# Patient Record
Sex: FEMALE | Race: WHITE | NOT HISPANIC OR LATINO | Employment: UNEMPLOYED | URBAN - METROPOLITAN AREA
[De-identification: names, ages, dates, MRNs, and addresses within clinical notes are randomized per-mention and may not be internally consistent; named-entity substitution may affect disease eponyms.]

---

## 2017-10-26 ENCOUNTER — HOSPITAL ENCOUNTER (OUTPATIENT)
Dept: PREADMISSION TESTING | Facility: HOSPITAL | Age: 42
Discharge: HOME | End: 2017-10-26
Attending: PODIATRIST
Payer: MEDICAID

## 2017-10-26 ENCOUNTER — ANESTHESIA EVENT (OUTPATIENT)
Dept: SURGERY | Facility: HOSPITAL | Age: 42
End: 2017-10-26
Payer: MEDICAID

## 2017-10-26 VITALS
BODY MASS INDEX: 34.36 KG/M2 | HEART RATE: 87 BPM | DIASTOLIC BLOOD PRESSURE: 78 MMHG | WEIGHT: 182 LBS | SYSTOLIC BLOOD PRESSURE: 144 MMHG | HEIGHT: 61 IN | OXYGEN SATURATION: 97 % | RESPIRATION RATE: 14 BRPM

## 2017-10-26 RX ORDER — SODIUM CHLORIDE, SODIUM LACTATE, POTASSIUM CHLORIDE, CALCIUM CHLORIDE 600; 310; 30; 20 MG/100ML; MG/100ML; MG/100ML; MG/100ML
INJECTION, SOLUTION INTRAVENOUS CONTINUOUS
Status: CANCELLED | OUTPATIENT
Start: 2017-10-26

## 2017-10-26 RX ORDER — LIDOCAINE HYDROCHLORIDE 10 MG/ML
1 INJECTION, SOLUTION EPIDURAL; INFILTRATION; INTRACAUDAL; PERINEURAL ONCE
Status: CANCELLED | OUTPATIENT
Start: 2017-10-26 | End: 2017-10-26

## 2017-10-26 NOTE — PRE-PROCEDURE INSTRUCTIONS
Ervin Familia - Boyfriend - [phone]    Allergies, medical, surgical, family and psychosocial histories reviewed with patient. Periop plan of care reviewed. Preop instructions given, including medications to take and to hold. Time allotted for questions to be addressed.  Patient verbalized understanding.

## 2017-10-26 NOTE — PRE-PROCEDURE INSTRUCTIONS
Pt states she had all of her testing done for clearance yesterday 10/25/17 at 2pm and is going to see her PCP today for final clearance.  Pt was given our fax number and instructed to have PCP office fax a copy of the testing and clearance to [phone] prior to leaving his office.  Pt verbalized understanding.

## 2017-10-26 NOTE — DISCHARGE INSTRUCTIONS
Your surgery is scheduled for 11/1/17.    Please report to Outpatient Surgery Intake Office on the 2nd FLOOR at 5:30 a.m.          INSTRUCTIONS IMPORTANT!!!  ¨ Do not eat or drink after 12 midnight-including water. OK to brush teeth, no   gum, candy or mints!        ____  Do not wear makeup, including mascara.  ____  No powder, lotions or creams to surgical area.  ____  Please remove all jewelry, including piercings and leave at home.  ____  No money or valuables needed. Please leave at home.  ____  Please bring any documents given by your doctor.  ____  If going home the same day, arrange for a ride home. You will not be able to             drive if Anesthesia was used.  ____  Wear loose fitting clothing. Allow for dressings, bandages.  ____  Stop Aspirin, Ibuprofen, Motrin and Aleve at least 3-5 days before surgery, unless otherwise instructed by your doctor, or the nurse.   You MAY use Tylenol/acetaminophen until day of surgery.  ____  Wash the surgical area with Hibiclens the night before surgery, and again the             morning of surgery.  Be sure to rinse hibiclens off completely (if instructed by   nurse).  ____  If you take diabetic medication, do not take am of surgery unless instructed by Doctor.  ____  Call MD for temperature above 101 degrees.  ____ Stop taking any Fish Oil supplement or any Vitamins that contain Vitamin E at least 5 days prior to surgery.  ____ Do Not wear your contact lenses the day of your procedure.  You may wear your glasses.        I have read or had read and explained to me, and understand the above information.  Additional comments or instructions:  For additional questions call [phone]     Take a Hibiclens shower twice a day for 3 days prior to surgery, including the morning of surgery.   Gargle with Listerine twice a day for 3 days prior to surgery, including the morning of surgery.      Pre-Op Bathing Instructions    Before surgery, you can play an important role in your  own health.    Because skin is not sterile, we need to be sure that your skin is as free of germs as possible. By following the instructions below, you can reduce the number of germs on your skin before surgery.    IMPORTANT: You will need to shower with a special soap called Hibiclens*, available at any pharmacy.  If you are allergic to Chlorhexidine (the antiseptic in Hibiclens), use an antibacterial soap such as Dial Soap for your preoperative shower.  You will shower with Hibiclens both the night before your surgery and the morning of your surgery.  Do not use Hibiclens on the head, face or genitals to avoid injury to those areas.    STEP #1: THE NIGHT BEFORE YOUR SURGERY     1. Do not shave the area of your body where your surgery will be performed.  2. Shower and wash your hair and body as usual with your normal soap and shampoo.  3. Rinse your hair and body thoroughly after you shower to remove all soap residue.  4. With your hand, apply one packet of Hibiclens soap to the surgical site.   5. Wash the site gently for five (5) minutes. Do not scrub your skin too hard.   6. Do not wash with your regular soap after Hibiclens is used.  7. Rinse your body thoroughly.  8. Pat yourself dry with a clean, soft towel.  9. Do not use lotion, cream, or powder.  10. Wear clean clothes.    STEP #2: THE MORNING OF YOUR SURGERY     1. Repeat Step #1.    * Not to be used by people allergic to Chlorhexidine.          Anesthesia: General Anesthesia     You are watched continuously during your procedure by your anesthesia provider.     Youre due to have surgery. During surgery, youll be given medicine called anesthesia or anesthetic. This will keep you comfortable and pain-free. Your anesthesia provider will use general anesthesia.  What is general anesthesia?  General anesthesia puts you into a state like deep sleep. It goes into the bloodstream (IV anesthetics), into the lungs (gas anesthetics), or both. You feel nothing  during the procedure. You will not remember it. During the procedure, the anesthesia provider monitors you continuously. He or she checks your heart rate and rhythm, blood pressure, breathing, and blood oxygen.  · IV anesthetics. IV anesthetics are given through an IV line in your arm. Theyre often given first. This is so you are asleep before a gas anesthetic is started. Some kinds of IV anesthetics relieve pain. Others relax you. Your doctor will decide which kind is best in your case.  · Gas anesthetics. Gas anesthetics are breathed into the lungs. They are often used to keep you asleep. They can be given through a facemask or a tube placed in your larynx or trachea (breathing tube).  ¨ If you have a facemask, your anesthesia provider will most likely place it over your nose and mouth while youre still awake. Youll breathe oxygen through the mask as your IV anesthetic is started. Gas anesthetic may be added through the mask.  ¨ If you have a tube in the larynx or trachea, it will be inserted into your throat after youre asleep.  Anesthesia tools and medicines  You will likely have:  · IV anesthetics. These are put into an IV line into your bloodstream.  · Gas anesthetics. You breathe these anesthetics into your lungs, where they pass into your bloodstream.  · Pulse oximeter. This is a small clip that is attached to the end of your finger. This measures your blood oxygen level.  · Electrocardiography leads (electrodes). These are small sticky pads that are placed on your chest. They record your heart rate and rhythm.  · Blood pressure cuff. This reads your blood pressure.  Risks and possible complications  General anesthesia has some risks. These include:  · Breathing problems  · Nausea and vomiting  · Sore throat or hoarseness (usually temporary)  · Allergic reaction to the anesthetic  · Irregular heartbeat (rare)  · Cardiac arrest (rare)   Anesthesia safety  · Follow all instructions you are given for how  long not to eat or drink before your procedure.  · Be sure your doctor knows what medicines and drugs you take. This includes over-the-counter medicines, herbs, supplements, alcohol or other drugs. You will be asked when those were last taken.  · Have an adult family member or friend drive you home after the procedure.  · For the first 24 hours after your surgery:  ¨ Do not drive or use heavy equipment.  ¨ Do not make important decisions or sign legal documents. If important decisions or signing legal documents is necessary during the first 24 hours after surgery, have a trusted family member or spouse act on your behalf.  ¨ Avoid alcohol.  ¨ Have a responsible adult stay with you. He or she can watch for problems and help keep you safe.  Date Last Reviewed: 12/1/2016  © 4889-9847 [employer]. [address] [place] [address], [place] [address]. All rights reserved. This information is not intended as a substitute for professional medical care. Always follow your healthcare professional's instructions.

## 2017-10-26 NOTE — ANESTHESIA PREPROCEDURE EVALUATION
10/26/2017  Raquel Rhodes is a 41 y.o., female is scheduled for left plantar fascia open release with neurolysis under GETA on 2017.    Outside PCP H&P and clearance with labs and EKG pending via fax on 10/26/2017.    Past Surgical History:   Procedure Laterality Date     SECTION      x 2    TONSILLECTOMY      TUBAL LIGATION           Anesthesia Evaluation    I have reviewed the Patient Summary Reports.    I have reviewed the Nursing Notes.   I have reviewed the Medications.     Review of Systems  Anesthesia Hx:  No problems with previous Anesthesia  History of prior surgery of interest to airway management or planning: Previous anesthesia: General, Epidural  Denies Personal Hx of Anesthesia complications.   Social:  Non-Smoker, No Alcohol Use    Hematology/Oncology:  Hematology Normal        EENT/Dental:EENT/Dental Normal   Cardiovascular:   Exercise tolerance: good Denies Hypertension.  Denies Dysrhythmias.   Denies Angina.        Pulmonary:   Denies Shortness of breath.    Renal/:  Renal/ Normal     Hepatic/GI:  Hepatic/GI Normal    Musculoskeletal:   Plantar fasciitis, left; pt with boot in place; denies pain at present   Neurological:  Neurology Normal    Endocrine:  Endocrine Normal        Physical Exam  General:  Obesity    Airway/Jaw/Neck:  Airway Findings: Mouth Opening: Normal Tongue: Normal  General Airway Assessment: Adult  Mallampati: II  TM Distance: Normal, at least 6 cm        Eyes/Ears/Nose:  EYES/EARS/NOSE FINDINGS: Normal   Dental:  Dental Findings: In tact    Chest/Lungs:  Chest/Lungs Clear    Heart/Vascular:  Heart Findings: Normal Heart murmur: negative    Abdomen:  Abdomen Findings: Normal    Musculoskeletal:  Musculoskeletal Findings: (boot to left foot)     Mental Status:  Mental Status Findings: Normal        Anesthesia Plan  Type of Anesthesia, risks &  benefits discussed:  Anesthesia Type:  general, regional  Patient's Preference:   Intra-op Monitoring Plan: standard ASA monitors  Intra-op Monitoring Plan Comments:   Post Op Pain Control Plan: multimodal analgesia  Post Op Pain Control Plan Comments:   Induction:   IV  Beta Blocker:  Patient is not currently on a Beta-Blocker (No further documentation required).       Informed Consent: Patient understands risks and agrees with Anesthesia plan.  Questions answered. Anesthesia consent signed with patient.  ASA Score: 2     Day of Surgery Review of History & Physical:            Ready For Surgery From Anesthesia Perspective.

## 2017-11-01 ENCOUNTER — ANESTHESIA (OUTPATIENT)
Dept: PREADMISSION TESTING | Facility: HOSPITAL | Age: 42
End: 2017-11-01
Payer: MEDICAID

## 2017-11-01 ENCOUNTER — HOSPITAL ENCOUNTER (OUTPATIENT)
Facility: HOSPITAL | Age: 42
Discharge: HOME | End: 2017-11-01
Attending: PODIATRIST | Admitting: PODIATRIST
Payer: MEDICAID

## 2017-11-01 DIAGNOSIS — M72.2 PLANTAR FASCIITIS: Primary | ICD-10-CM

## 2017-11-01 DIAGNOSIS — D36.10 NEUROMA: ICD-10-CM

## 2017-11-01 LAB
B-HCG UR QL: NEGATIVE
CTP QC/QA: YES

## 2017-11-01 PROCEDURE — 37000008 HC ANESTHESIA 1ST 15 MINUTES: Performed by: PODIATRIST

## 2017-11-01 PROCEDURE — 25000003 PHARM REV CODE 250: Performed by: NURSE PRACTITIONER

## 2017-11-01 PROCEDURE — 63600175 PHARM REV CODE 636 W HCPCS: Performed by: NURSE ANESTHETIST, CERTIFIED REGISTERED

## 2017-11-01 PROCEDURE — 71000015 HC POSTOP RECOV 1ST HR: Performed by: PODIATRIST

## 2017-11-01 PROCEDURE — 25000003 PHARM REV CODE 250: Performed by: PODIATRIST

## 2017-11-01 PROCEDURE — 37000009 HC ANESTHESIA EA ADD 15 MINS: Performed by: PODIATRIST

## 2017-11-01 PROCEDURE — 97161 PT EVAL LOW COMPLEX 20 MIN: CPT

## 2017-11-01 PROCEDURE — 36000706: Performed by: PODIATRIST

## 2017-11-01 PROCEDURE — 81025 URINE PREGNANCY TEST: CPT | Performed by: PODIATRIST

## 2017-11-01 PROCEDURE — V2790 AMNIOTIC MEMBRANE: HCPCS | Performed by: PODIATRIST

## 2017-11-01 PROCEDURE — 36000707: Performed by: PODIATRIST

## 2017-11-01 PROCEDURE — G8979 MOBILITY GOAL STATUS: HCPCS | Mod: CJ

## 2017-11-01 PROCEDURE — 71000016 HC POSTOP RECOV ADDL HR: Performed by: PODIATRIST

## 2017-11-01 PROCEDURE — S0077 INJECTION, CLINDAMYCIN PHOSP: HCPCS | Performed by: PODIATRIST

## 2017-11-01 PROCEDURE — G8980 MOBILITY D/C STATUS: HCPCS | Mod: CJ

## 2017-11-01 PROCEDURE — 63600175 PHARM REV CODE 636 W HCPCS: Performed by: PODIATRIST

## 2017-11-01 PROCEDURE — G8978 MOBILITY CURRENT STATUS: HCPCS | Mod: CJ

## 2017-11-01 DEVICE — IMPLANTABLE DEVICE: Type: IMPLANTABLE DEVICE | Site: FOOT | Status: FUNCTIONAL

## 2017-11-01 RX ORDER — HYDROMORPHONE HYDROCHLORIDE 2 MG/ML
0.5 INJECTION, SOLUTION INTRAMUSCULAR; INTRAVENOUS; SUBCUTANEOUS EVERY 5 MIN PRN
Status: CANCELLED | OUTPATIENT
Start: 2017-11-01

## 2017-11-01 RX ORDER — LIDOCAINE HYDROCHLORIDE 10 MG/ML
1 INJECTION, SOLUTION EPIDURAL; INFILTRATION; INTRACAUDAL; PERINEURAL ONCE
Status: DISCONTINUED | OUTPATIENT
Start: 2017-11-01 | End: 2017-11-01 | Stop reason: HOSPADM

## 2017-11-01 RX ORDER — FENTANYL CITRATE 50 UG/ML
INJECTION, SOLUTION INTRAMUSCULAR; INTRAVENOUS
Status: DISCONTINUED | OUTPATIENT
Start: 2017-11-01 | End: 2017-11-01

## 2017-11-01 RX ORDER — CLINDAMYCIN PHOSPHATE 600 MG/50ML
600 INJECTION, SOLUTION INTRAVENOUS
Status: COMPLETED | OUTPATIENT
Start: 2017-11-06 | End: 2017-11-01

## 2017-11-01 RX ORDER — HYDROMORPHONE HYDROCHLORIDE 2 MG/ML
0.2 INJECTION, SOLUTION INTRAMUSCULAR; INTRAVENOUS; SUBCUTANEOUS EVERY 5 MIN PRN
Status: CANCELLED | OUTPATIENT
Start: 2017-11-01

## 2017-11-01 RX ORDER — LIDOCAINE HYDROCHLORIDE 10 MG/ML
INJECTION, SOLUTION EPIDURAL; INFILTRATION; INTRACAUDAL; PERINEURAL
Status: DISCONTINUED | OUTPATIENT
Start: 2017-11-01 | End: 2017-11-01 | Stop reason: HOSPADM

## 2017-11-01 RX ORDER — LIDOCAINE HCL/PF 100 MG/5ML
SYRINGE (ML) INTRAVENOUS
Status: DISCONTINUED | OUTPATIENT
Start: 2017-11-01 | End: 2017-11-01

## 2017-11-01 RX ORDER — MIDAZOLAM HYDROCHLORIDE 1 MG/ML
INJECTION, SOLUTION INTRAMUSCULAR; INTRAVENOUS
Status: DISCONTINUED | OUTPATIENT
Start: 2017-11-01 | End: 2017-11-01

## 2017-11-01 RX ORDER — ROPIVACAINE HYDROCHLORIDE 5 MG/ML
INJECTION, SOLUTION EPIDURAL; INFILTRATION; PERINEURAL
Status: DISCONTINUED | OUTPATIENT
Start: 2017-11-01 | End: 2017-11-01 | Stop reason: HOSPADM

## 2017-11-01 RX ORDER — ONDANSETRON 2 MG/ML
4 INJECTION INTRAMUSCULAR; INTRAVENOUS DAILY PRN
Status: CANCELLED | OUTPATIENT
Start: 2017-11-01

## 2017-11-01 RX ORDER — PROPOFOL 10 MG/ML
VIAL (ML) INTRAVENOUS
Status: DISCONTINUED | OUTPATIENT
Start: 2017-11-01 | End: 2017-11-01

## 2017-11-01 RX ORDER — PROPOFOL 10 MG/ML
VIAL (ML) INTRAVENOUS CONTINUOUS PRN
Status: DISCONTINUED | OUTPATIENT
Start: 2017-11-01 | End: 2017-11-01

## 2017-11-01 RX ORDER — SODIUM CHLORIDE, SODIUM LACTATE, POTASSIUM CHLORIDE, CALCIUM CHLORIDE 600; 310; 30; 20 MG/100ML; MG/100ML; MG/100ML; MG/100ML
INJECTION, SOLUTION INTRAVENOUS CONTINUOUS
Status: DISCONTINUED | OUTPATIENT
Start: 2017-11-01 | End: 2017-11-01 | Stop reason: HOSPADM

## 2017-11-01 RX ADMIN — PROPOFOL 150 MCG/KG/MIN: 10 INJECTION, EMULSION INTRAVENOUS at 07:11

## 2017-11-01 RX ADMIN — CLINDAMYCIN PHOSPHATE 600 MG: 12 INJECTION, SOLUTION INTRAVENOUS at 07:11

## 2017-11-01 RX ADMIN — PROPOFOL 70 MG: 10 INJECTION, EMULSION INTRAVENOUS at 07:11

## 2017-11-01 RX ADMIN — SODIUM CHLORIDE, SODIUM LACTATE, POTASSIUM CHLORIDE, AND CALCIUM CHLORIDE: .6; .31; .03; .02 INJECTION, SOLUTION INTRAVENOUS at 07:11

## 2017-11-01 RX ADMIN — MIDAZOLAM 2 MG: 1 INJECTION INTRAMUSCULAR; INTRAVENOUS at 07:11

## 2017-11-01 RX ADMIN — FENTANYL CITRATE 50 MCG: 50 INJECTION, SOLUTION INTRAMUSCULAR; INTRAVENOUS at 07:11

## 2017-11-01 RX ADMIN — LIDOCAINE HYDROCHLORIDE 60 MG: 20 INJECTION, SOLUTION INTRAVENOUS at 07:11

## 2017-11-01 NOTE — BRIEF OP NOTE
[facility]  Brief Operative Note     SUMMARY     Surgery Date: 11/1/2017     Surgeon(s) and Role:     * Corine Frausto MD - Resident - Assisting     * Colby Wise DPM - Primary        Pre-op Diagnosis:  Plantar fasciitis [M72.2]    Post-op Diagnosis:  Post-Op Diagnosis Codes:     * Plantar fasciitis [M72.2]    Procedure(s) (LRB):  OPEN RELEASE W/NEUROLYSIS Plantar fascia  (Left)    Anesthesia: Local MAC    Description of the findings of the procedure: Open release of plantar fascia left     Findings/Key Components: same as above     Estimated Blood Loss: <5ml         Specimens:   Specimen (12h ago through future)    None          Discharge Note    SUMMARY     Admit Date: 11/1/2017    Discharge Date and Time:  11/01/2017 8:23 AM    Hospital Course (synopsis of major diagnoses, care, treatment, and services provided during the course of the hospital stay): Patient was admitted for an inpatient procedure and tolerated the procedure well with no complications.       Final Diagnosis: Post-Op Diagnosis Codes:     * Plantar fasciitis [M72.2]    Disposition: Home or Self Care    Follow Up/Patient Instructions:     Medications:  Reconciled Home Medications:   Current Discharge Medication List      CONTINUE these medications which have NOT CHANGED    Details   acetaminophen-codeine 300-30mg (TYLENOL-CODEINE #3) 300-30 mg Tab Take by mouth.             Discharge Procedure Orders  Diet general     Weight bearing restrictions (specify)   Order Comments: Minimal Heel WB     Call MD for:  temperature >100.4     Call MD for:  persistent nausea and vomiting     Call MD for:  severe uncontrolled pain     Call MD for:  difficulty breathing, headache or visual disturbances     Call MD for:  redness, tenderness, or signs of infection (pain, swelling, redness, odor or green/yellow discharge around incision site)     Call MD for:  hives     Leave dressing on - Keep it clean, dry, and intact until clinic visit

## 2017-11-01 NOTE — PT/OT/SLP EVAL
Physical Therapy  Evaluation and Discharge Note    Raquel Rhodes   MRN: [id number]   Admitting Diagnosis: The primary encounter diagnosis was Plantar fasciitis. A diagnosis of Neuroma was also pertinent to this visit.    PT Received On: 17  PT Start Time: 903     PT Stop Time: 915    PT Total Time (min): 12 min       Billable Minutes:  Evaluation 12    Diagnosis:   The primary encounter diagnosis was Plantar fasciitis. A diagnosis of Neuroma was also pertinent to this visit. s/p surgery 17    History reviewed. No pertinent past medical history.   Past Surgical History:   Procedure Laterality Date     SECTION      x 2    TONSILLECTOMY      TUBAL LIGATION      after        Referring physician: Dr. Wise  Date referred to PT: 2017    General Precautions: Standard, fall  Orthopedic Precautions: LLE weight bearing as tolerated   Braces:  (LLE walking boot)            Patient History:  Living Environment Comment: Pt lives with her  and daughter in [facility] with no steps and tub/shower. Pt was independent with all functional mobility without AD at baseline.  Equipment Currently Used at Home: none  DME owned (not currently used): none    Previous Level of Function:  Ambulation Skills: independent  Transfer Skills: independent  ADL Skills: independent    Subjective:  Communicated with RN prior to session.  Pt reports she feels fine and she was able to just get to the bathroom.  Chief Complaint: none reported  Patient goals: to return home    Pain/Comfort  Pain Rating 1: 0/10      Objective:         Cognitive Exam:  Oriented to: Person, Place, Time and Situation    Follows Commands/attention: Follows multistep  commands  Communication: clear/fluent  Safety awareness/insight to disability: intact    Physical Exam:  Postural examination/scapula alignment: No postural abnormalities identified    Skin integrity: Visible skin intact and LLE boot donned  Edema: None noted     Sensation:    Intact    Lower Extremity Range of Motion:  Right Lower Extremity: WFL  Left Lower Extremity: WFL except ankle not assessed, in boot    Lower Extremity Strength:  Right Lower Extremity: WFL  Left Lower Extremity: LLE not assessed     Fine motor coordination:  Intact    Gross motor coordination: WFL    Functional Mobility:  Bed Mobility:  Scooting/Bridging: Modified Independent  Supine to Sit: Modified Independent  Sit to Supine: Modified Independent    Transfers:  Sit <> Stand Assistance: Supervision  Sit <> Stand Assistive Device: Axillary crutches    Gait:   Gait Distance: ~50 feet with B axillary crutches and supervision for verbal cues for 3-point gait pattern  Assistance 1: Supervision  Gait Assistive Device: Axillary crutches  Gait Pattern: 3-point gait  Gait Deviation(s): decreased hamida, increased time in double stance    Balance:   Static Sit: NORMAL: No deviations seen in posture held statically  Dynamic Sit: NORMAL: No deviations seen in posture held dynamically  Static Stand: NORMAL: No deviations seen in posture held statically  Dynamic stand: GOOD: Needs SUPERVISION only during gait and able to self right with moderate     Therapeutic Activities and Exercises:  Pt instructed in gait training with B axillary crutches and pt completed with supervision for cueing for 3-point gait pattern. Pt educated on step negotiation with crutches and pt verbalized understanding.    AM-PAC 6 CLICK MOBILITY  How much help from another person does this patient currently need?   1 = Unable, Total/Dependent Assistance  2 = A lot, Maximum/Moderate Assistance  3 = A little, Minimum/Contact Guard/Supervision  4 = None, Modified Cameron/Independent    Turning over in bed (including adjusting bedclothes, sheets and blankets)?: 4  Sitting down on and standing up from a chair with arms (e.g., wheelchair, bedside commode, etc.): 3  Moving from lying on back to sitting on the side of the bed?: 4  Moving to and from a bed  to a chair (including a wheelchair)?: 3  Need to walk in hospital room?: 3  Climbing 3-5 steps with a railing?: 3  Total Score: 20     AM-PAC Raw Score CMS G-Code Modifier Level of Impairment Assistance   6 % Total / Unable   7 - 9 CM 80 - 100% Maximal Assist   10 - 14 CL 60 - 80% Moderate Assist   15 - 19 CK 40 - 60% Moderate Assist   20 - 22 CJ 20 - 40% Minimal Assist   23 CI 1-20% SBA / CGA   24 CH 0% Independent/ Mod I     Patient left HOB elevated with call button in reach, RN notified and pt's  present.    Assessment:   Raquel Rhodes is a 41 y.o. female with a medical diagnosis of The primary encounter diagnosis was Plantar fasciitis. A diagnosis of Neuroma was also pertinent to this visit. and presents with LLE WBAT in boot and was instructed in gait training with B axillary crutches. Pt will benefit from OP PT upon d/c home.    Rehab identified problem list/impairments: Rehab identified problem list/impairments: weakness, impaired endurance, impaired self care skills, impaired functional mobilty, gait instability, impaired balance, orthopedic precautions    Rehab potential is good.    Activity tolerance: Good    Discharge recommendations: Discharge Facility/Level Of Care Needs: outpatient PT     Barriers to discharge: Barriers to Discharge: None    Equipment recommendations: Equipment Needed After Discharge: crutches, axillary     GOALS:    Physical Therapy Goals     Not on file          Multidisciplinary Problems (Resolved)        Problem: Physical Therapy Goal    Goal Priority Disciplines Outcome Goal Variances Interventions   Physical Therapy Goal   (Resolved)     PT/OT, PT Outcome(s) achieved     Description:  PT evaluation and gait training with crutches complete                    PLAN:    Patient to be seen  (d/c PT ) to address the above listed problems via    Plan of Care expires: 11/01/17  Plan of Care reviewed with: patient, spouse    Functional Assessment Tool Used: [facility]  Score:  20  Functional Limitation: Mobility: Walking and moving around  Mobility: Walking and Moving Around Current Status (): TREVON  Mobility: Walking and Moving Around Goal Status (): TREVON  Mobility: Walking and Moving Around Discharge Status (): TREVON Cristina, PT  11/01/2017

## 2017-11-01 NOTE — H&P
Anesthesia Plan  Type of Anesthesia, risks & benefits discussed:  Anesthesia Type:  general, regional  Patient's Preference:   Intra-op Monitoring Plan: standard ASA monitors  Intra-op Monitoring Plan Comments:   Post Op Pain Control Plan: multimodal analgesia  Post Op Pain Control Plan Comments:   Induction:   IV  Beta Blocker:  Patient is not currently on a Beta-Blocker (No further documentation required).       Informed Consent: Patient understands risks and agrees with Anesthesia plan.  Questions answered. Anesthesia consent signed with patient.  ASA Score: 2     Day of Surgery Review of History & Physical:            Ready For Surgery From Anesthesia Perspective.

## 2017-11-01 NOTE — PLAN OF CARE
Patient sitting up in bed, dressed, spouse at bedside. Patient aaox3. Vss. Denies any pain at this time. Patient evaluated by physical therapy and okay for discharge. Crutches provided to patient per PT. Patient states she feels comfortable for discharge. Patient urinated. Instructions given to the patient per MD orders with time for questions, verbalized understanding. Patient states she has her pain medication at home. Patient discharged via wheelchair to the car with spouse.

## 2017-11-01 NOTE — INTERVAL H&P NOTE
The patient has been examined and the H&P has been reviewed:    no changes    Anesthesia/Surgery risks, benefits and alternative options discussed and understood by patient/family.          Active Hospital Problems    Diagnosis  POA    Neuroma [D36.10]  Yes      Resolved Hospital Problems    Diagnosis Date Resolved POA   No resolved problems to display.

## 2017-11-01 NOTE — TRANSFER OF CARE
"Anesthesia Transfer of Care Note    Patient: Raquel Rhodes    Procedure(s) Performed: Procedure(s) (LRB):  OPEN RELEASE W/NEUROLYSIS (Left)    Patient location: OPS    Anesthesia Type: MAC    Transport from OR: Transported from OR on room air with adequate spontaneous ventilation    Post pain: adequate analgesia    Post assessment: no apparent anesthetic complications and tolerated procedure well    Post vital signs: stable    Level of consciousness: awake, alert and oriented    Nausea/Vomiting: no nausea/vomiting    Complications: none    Transfer of care protocol was followed      Last vitals:   Visit Vitals  BP (!) 144/66 (BP Location: Right arm, Patient Position: Lying)   Pulse 83   Temp 37.1 °C (98.8 °F) (Oral)   Resp 20   Ht 5' 1" (1.549 m)   Wt 82.6 kg (182 lb)   LMP 10/31/2017   SpO2 99%   BMI 34.39 kg/m²     "

## 2017-11-01 NOTE — ANESTHESIA POSTPROCEDURE EVALUATION
"Anesthesia Post Evaluation    Patient: Raquel Rhodes    Procedure(s) Performed: Procedure(s) (LRB):  OPEN RELEASE W/NEUROLYSIS (Left)    Final Anesthesia Type: MAC  Patient location during evaluation: OPS  Patient participation: Yes- Able to Participate  Level of consciousness: awake and alert and oriented  Post-procedure vital signs: reviewed and stable  Pain management: adequate  Airway patency: patent  PONV status at discharge: No PONV  Anesthetic complications: no      Cardiovascular status: blood pressure returned to baseline and hemodynamically stable  Respiratory status: unassisted, spontaneous ventilation and room air  Hydration status: euvolemic  Follow-up not needed.        Visit Vitals  BP (!) 144/66 (BP Location: Right arm, Patient Position: Lying)   Pulse 83   Temp 37.1 °C (98.8 °F) (Oral)   Resp 20   Ht 5' 1" (1.549 m)   Wt 82.6 kg (182 lb)   LMP 10/31/2017   SpO2 99%   BMI 34.39 kg/m²       Pain/Sheron Score: Pain Assessment Performed: Yes (11/1/2017  6:15 AM)  Presence of Pain: denies (11/1/2017  6:15 AM)  Sheron Score: 10 (11/1/2017  6:15 AM)      "

## 2017-11-01 NOTE — PLAN OF CARE
Problem: Physical Therapy Goal  Goal: Physical Therapy Goal  PT evaluation and gait training with crutches complete  Outcome: Outcome(s) achieved Date Met: 11/01/17  Gait training with axillary crutches completed.

## 2017-11-02 VITALS
OXYGEN SATURATION: 100 % | SYSTOLIC BLOOD PRESSURE: 127 MMHG | RESPIRATION RATE: 16 BRPM | HEIGHT: 61 IN | BODY MASS INDEX: 34.36 KG/M2 | WEIGHT: 182 LBS | HEART RATE: 74 BPM | TEMPERATURE: 98 F | DIASTOLIC BLOOD PRESSURE: 66 MMHG

## 2017-11-02 NOTE — OP NOTE
Operative Note       Surgery Date: 11/1/2017     Surgeon(s) and Role:     * Corine Frausto MD - Resident - Assisting     * Colby Wise DPM - Primary    Pre-op Diagnosis:  Plantar fasciitis [M72.2]    Post-op Diagnosis: Post-Op Diagnosis Codes:     * Plantar fasciitis [M72.2]    Procedure(s) (LRB):  OPEN RELEASE W/NEUROLYSIS (Left) Plantar fasciotomy open     Anesthesia: Local MAC + 18 cc of 0.25% marcaine plain    Procedure in Detail/Findings:    The patient was brought to the operating room on a stretcher and placed on the operating table in a supine position. Following the successful induction of MAC anesthesia, a tourniquet was applied to the patients left ankle. Following this, a local anesthetic block consisting of aproximately 18cc of 0.25% marcaine plain was injected. Then, the left  foot was scrubbed, prepped and draped in the usual aseptic manner. A marking pen was utilized to create a incision guide in a medial  fashion. A time out was performed and an esmark was used to exsanguinate the foot. The tourniquet was inflated to 250mmHg.      Attention was directed towards the inferior medial side of the left heel at the just distal to the plantar fascia insertion site at the calcaneus where a #15 blade was used to make a 5 cm skin incision. The incision was blunted deepened down to the level of the medial plantar fascia. Care was taken to avoid and protect all neurovascular structures. Multiple adhesions noted , all freed up with the #15 blade. Next abductor hallucis muscle identified along with calcaneal nerve. A # 15 blade was used to release plantar fascia.  Adequate release of the medial half of the plantar fascia was noted with exposure of the underlying FDB muscle belly. The area was copiously flushed with saline. The skin edges were reaaproximated with 4-0 nylon. Tourniquet was released with NVS noted to be intact. 2cc of bioD amniotic tissue injected. Triple abx, 4x4 gauze , kerlix and ACE  "applied. CAM boot was applied.      The patient tolerated the procedure and anesthesia well. He was transferred to the recovery room with vital signs stable, vascular status intact, and capillary refill time < 3 seconds to the distal left foot.    Corine Frausto DPM PGY-3      Estimated Blood Loss: <5ml           Specimens     None        Implants:   Implant Name Type Inv. Item Serial No.  Lot No. LRB No. Used   [id number]     [id number] [employer]   Left 1              Disposition: PACU - hemodynamically stable.           Condition: Good    Attestation:  I was present and scrubbed for the entire procedure.           Discharge Note    Admit Date: 11/1/2017    Attending Physician: No att. providers found     Discharge Physician: No att. providers found    Final Diagnosis: Post-Op Diagnosis Codes:     * Plantar fasciitis [M72.2]    Disposition: Home or Self Care    Patient Instructions:   Discharge Medication List as of 11/1/2017  9:27 AM      CONTINUE these medications which have NOT CHANGED    Details   acetaminophen-codeine 300-30mg (TYLENOL-CODEINE #3) 300-30 mg Tab Take by mouth., Historical Med             Discharge Procedure Orders (must include Diet, Follow-up, Activity)    Discharge Procedure Orders (must include Diet, Follow-up, Activity)  CRUTCHES FOR HOME USE   Order Specific Question Answer Comments   Type: Axillary    Height: 5' 1" (1.549 m)    Weight: 82.6 kg (182 lb)    Length of need (1-99 months): 99      Diet general     Weight bearing restrictions (specify)   Order Comments: Minimal Heel WB     Call MD for:  temperature >100.4     Call MD for:  persistent nausea and vomiting     Call MD for:  severe uncontrolled pain     Call MD for:  difficulty breathing, headache or visual disturbances     Call MD for:  redness, tenderness, or signs of infection (pain, swelling, redness, odor or green/yellow discharge around incision site)     Call MD for:  hives     Leave dressing on - Keep it " clean, dry, and intact until clinic visit          Discharge Date: 11/1/2017  9:40 AM

## 2018-10-04 ENCOUNTER — TELEPHONE (OUTPATIENT)
Dept: OBSTETRICS AND GYNECOLOGY | Facility: CLINIC | Age: 43
End: 2018-10-04

## 2018-11-08 ENCOUNTER — OFFICE VISIT (OUTPATIENT)
Dept: OBSTETRICS AND GYNECOLOGY | Facility: CLINIC | Age: 43
End: 2018-11-08
Payer: MEDICAID

## 2018-11-08 VITALS
HEIGHT: 61 IN | BODY MASS INDEX: 38.75 KG/M2 | SYSTOLIC BLOOD PRESSURE: 120 MMHG | WEIGHT: 205.25 LBS | DIASTOLIC BLOOD PRESSURE: 80 MMHG

## 2018-11-08 DIAGNOSIS — Z01.419 WELL WOMAN EXAM WITH ROUTINE GYNECOLOGICAL EXAM: Primary | ICD-10-CM

## 2018-11-08 DIAGNOSIS — N92.0 MENORRHAGIA WITH REGULAR CYCLE: ICD-10-CM

## 2018-11-08 PROCEDURE — 88175 CYTOPATH C/V AUTO FLUID REDO: CPT

## 2018-11-08 PROCEDURE — 99386 PREV VISIT NEW AGE 40-64: CPT | Mod: S$PBB,,, | Performed by: OBSTETRICS & GYNECOLOGY

## 2018-11-08 PROCEDURE — 99213 OFFICE O/P EST LOW 20 MIN: CPT | Mod: PBBFAC,PO | Performed by: OBSTETRICS & GYNECOLOGY

## 2018-11-08 PROCEDURE — 99999 PR PBB SHADOW E&M-EST. PATIENT-LVL III: CPT | Mod: PBBFAC,,, | Performed by: OBSTETRICS & GYNECOLOGY

## 2018-11-08 RX ORDER — BUSPIRONE HYDROCHLORIDE 30 MG/1
30 TABLET ORAL 2 TIMES DAILY
COMMUNITY
End: 2020-07-14

## 2018-11-08 RX ORDER — FLUOXETINE HYDROCHLORIDE 20 MG/1
20 CAPSULE ORAL DAILY
COMMUNITY
End: 2019-01-31

## 2018-11-08 NOTE — PROGRESS NOTES
Subjective:       Patient ID: Raquel Rhodes is a 42 y.o. female.    Chief Complaint: Well Woman (having clotting bleeding with cycles)    Ist visit; has heavy periods; hx 2 C/S and BTL  Walking boot for left ankle  Medical and surgical ,options discussed---best probably ablation---info given    HPI  Review of Systems   Gastrointestinal: Negative for abdominal distention, abdominal pain, constipation and nausea.   Genitourinary: Negative for dyspareunia, dysuria, genital sores, pelvic pain, vaginal bleeding and vaginal discharge.       Objective:      Physical Exam   Constitutional: She appears well-developed and well-nourished.   Pulmonary/Chest: Right breast exhibits no mass, no nipple discharge, no skin change and no tenderness. Left breast exhibits no mass, no nipple discharge, no skin change and no tenderness.   Abdominal: Soft. Bowel sounds are normal. She exhibits no distension and no mass. There is no tenderness. There is no rebound and no guarding. Hernia confirmed negative in the right inguinal area and confirmed negative in the left inguinal area.   Genitourinary: Rectum normal, vagina normal and uterus normal. No breast tenderness or discharge. There is no lesion on the right labia. There is no lesion on the left labia. Uterus is not fixed and not tender. Cervix exhibits no motion tenderness, no discharge and no friability. Right adnexum displays no mass, no tenderness and no fullness. Left adnexum displays no mass, no tenderness and no fullness. No tenderness in the vagina. No vaginal discharge found.   Lymphadenopathy:        Right: No inguinal adenopathy present.        Left: No inguinal adenopathy present.       Assessment:       1. Well woman exam with routine gynecological exam    2. Menorrhagia with regular cycle        Plan:         annual gyn visit; pap and mammogram; consider ablation

## 2018-11-09 ENCOUNTER — TELEPHONE (OUTPATIENT)
Dept: OBSTETRICS AND GYNECOLOGY | Facility: CLINIC | Age: 43
End: 2018-11-09

## 2018-11-09 DIAGNOSIS — N92.4 EXCESSIVE BLEEDING IN PREMENOPAUSAL PERIOD: Primary | ICD-10-CM

## 2018-11-09 NOTE — TELEPHONE ENCOUNTER
----- Message from Mayela Helm sent at 11/9/2018  1:22 PM CST -----  Contact: [phone]/self  Patient requesting to speak with you concerning scheduling a hysterectomy.    Please call and advise

## 2018-11-09 NOTE — TELEPHONE ENCOUNTER
Ablation was discussed at her appointment yesterday but patient would much prefer doing a Hysterectomy.     Please advise

## 2018-11-16 ENCOUNTER — TELEPHONE (OUTPATIENT)
Dept: OBSTETRICS AND GYNECOLOGY | Facility: CLINIC | Age: 43
End: 2018-11-16

## 2018-11-16 NOTE — TELEPHONE ENCOUNTER
----- Message from Olamide Mondragon sent at 11/16/2018 11:43 AM CST -----  Contact: [phone]/self  Patient requesting to speak with you regarding her test results and surgery. Please advise.

## 2018-11-26 ENCOUNTER — HOSPITAL ENCOUNTER (OUTPATIENT)
Dept: RADIOLOGY | Facility: HOSPITAL | Age: 43
Discharge: HOME | End: 2018-11-26
Attending: OBSTETRICS & GYNECOLOGY
Payer: MEDICAID

## 2018-11-26 ENCOUNTER — TELEPHONE (OUTPATIENT)
Dept: OBSTETRICS AND GYNECOLOGY | Facility: CLINIC | Age: 43
End: 2018-11-26

## 2018-11-26 DIAGNOSIS — Z01.419 WELL WOMAN EXAM WITH ROUTINE GYNECOLOGICAL EXAM: ICD-10-CM

## 2018-11-26 DIAGNOSIS — Z12.31 SCREENING MAMMOGRAM, ENCOUNTER FOR: ICD-10-CM

## 2018-11-26 PROCEDURE — 77063 BREAST TOMOSYNTHESIS BI: CPT | Mod: TC

## 2018-11-26 PROCEDURE — 77067 SCR MAMMO BI INCL CAD: CPT | Mod: 26,,, | Performed by: RADIOLOGY

## 2018-11-26 PROCEDURE — 77067 SCR MAMMO BI INCL CAD: CPT | Mod: TC

## 2018-11-26 PROCEDURE — 77063 BREAST TOMOSYNTHESIS BI: CPT | Mod: 26,,, | Performed by: RADIOLOGY

## 2018-11-26 NOTE — LETTER
November 27, 2018    Raquel Rhodes  [address] [place] [address]             [facility]  [address], [address]  [address] [place]  [place] [address]  Phone: [phone] Dear Ms. Rhodes:    The results of your most recent Pap smear are normal. This means that no cancerous or precancerous cells were seen. We recommend that you come back in 1 year for your annual exam and 1 years for your next Pap smear.    If you have any questions or concerns, please don't hesitate to call.    Sincerely,        Dr. Alfred Gann

## 2018-11-28 ENCOUNTER — TELEPHONE (OUTPATIENT)
Dept: OBSTETRICS AND GYNECOLOGY | Facility: CLINIC | Age: 43
End: 2018-11-28

## 2018-11-28 NOTE — TELEPHONE ENCOUNTER
----- Message from Yazmin Pierce sent at 11/28/2018 11:54 AM CST -----  Contact: self / [phone]  Patient is requesting a call back regarding, needs to know a date of her Hysterectomy. Please advise

## 2019-01-07 ENCOUNTER — HOSPITAL ENCOUNTER (OUTPATIENT)
Dept: PREADMISSION TESTING | Facility: HOSPITAL | Age: 44
Discharge: HOME | End: 2019-01-07
Attending: OBSTETRICS & GYNECOLOGY
Payer: MEDICAID

## 2019-01-07 ENCOUNTER — ANESTHESIA EVENT (OUTPATIENT)
Dept: SURGERY | Facility: HOSPITAL | Age: 44
End: 2019-01-07
Payer: MEDICAID

## 2019-01-07 ENCOUNTER — TELEPHONE (OUTPATIENT)
Dept: OBSTETRICS AND GYNECOLOGY | Facility: CLINIC | Age: 44
End: 2019-01-07

## 2019-01-07 RX ORDER — HYDROXYZINE HYDROCHLORIDE 25 MG/1
25 TABLET, FILM COATED ORAL 2 TIMES DAILY PRN
COMMUNITY
End: 2020-07-14

## 2019-01-07 RX ORDER — SODIUM CHLORIDE, SODIUM LACTATE, POTASSIUM CHLORIDE, CALCIUM CHLORIDE 600; 310; 30; 20 MG/100ML; MG/100ML; MG/100ML; MG/100ML
INJECTION, SOLUTION INTRAVENOUS CONTINUOUS
Status: CANCELLED | OUTPATIENT
Start: 2019-01-07

## 2019-01-07 RX ORDER — LIDOCAINE HYDROCHLORIDE 10 MG/ML
1 INJECTION, SOLUTION EPIDURAL; INFILTRATION; INTRACAUDAL; PERINEURAL ONCE
Status: CANCELLED | OUTPATIENT
Start: 2019-01-07 | End: 2019-01-07

## 2019-01-07 NOTE — DISCHARGE INSTRUCTIONS
Your surgery is scheduled for 1/9/19.    Please report to Outpatient Surgery Intake Office on the 2nd FLOOR at 10:00 a.m.          INSTRUCTIONS IMPORTANT!!!  ¨ Do not eat or drink after 12 midnight-including water. OK to brush teeth, no   gum, candy or mints!      ____  Proceed to [facility] on 1/7/19 for additional blood test.        ____  Do not wear makeup, including mascara.  ____  No powder, lotions or creams to surgical area.  ____  Please remove all jewelry, including piercings and leave at home.  ____  No money or valuables needed. Please leave at home.  ____  Please bring any documents given by your doctor.  ____  If going home the same day, arrange for a ride home. You will not be able to             drive if Anesthesia was used.  ____  Wear loose fitting clothing. Allow for dressings, bandages.  ____  Stop Aspirin, Ibuprofen, Motrin and Aleve at least 3-5 days before surgery, unless otherwise instructed by your doctor, or the nurse.   You MAY use Tylenol/acetaminophen until day of surgery.  ____  Wash the surgical area with Hibiclens the night before surgery, and again the             morning of surgery.  Be sure to rinse hibiclens off completely (if instructed by   nurse).  ____  If you take diabetic medication, do not take am of surgery unless instructed by Doctor.  ____  Call MD for temperature above 101 degrees or any other signs of infection such as Urinary (bladder) infection, Upper respiratory infection, skin boils, etc.  ____ Stop taking any Fish Oil supplement or any Vitamins that contain Vitamin E at least 5 days prior to surgery.  ____ Do Not wear your contact lenses the day of your procedure.  You may wear your glasses.      ____Do not shave for 3 days prior to surgery.  ____ Practice Good hand washing before, during, and after procedure.      I have read or had read and explained to me, and understand the above information.  Additional comments or instructions:  For additional  questions call [phone]      Pre-Op Bathing Instructions    Before surgery, you can play an important role in your own health.    Because skin is not sterile, we need to be sure that your skin is as free of germs as possible. By following the instructions below, you can reduce the number of germs on your skin before surgery.    IMPORTANT: You will need to shower with a special soap called Hibiclens*, available at any pharmacy.  If you are allergic to Chlorhexidine (the antiseptic in Hibiclens), use an antibacterial soap such as Dial Soap for your preoperative shower.  You will shower with Hibiclens both the night before your surgery and the morning of your surgery.  Do not use Hibiclens on the head, face or genitals to avoid injury to those areas.    STEP #1: THE NIGHT BEFORE YOUR SURGERY     1. Do not shave the area of your body where your surgery will be performed.  2. Shower and wash your hair and body as usual with your normal soap and shampoo.  3. Rinse your hair and body thoroughly after you shower to remove all soap residue.  4. With your hand, apply one packet of Hibiclens soap to the surgical site.   5. Wash the site gently for five (5) minutes. Do not scrub your skin too hard.   6. Do not wash with your regular soap after Hibiclens is used.  7. Rinse your body thoroughly.  8. Pat yourself dry with a clean, soft towel.  9. Do not use lotion, cream, or powder.  10. Wear clean clothes.    STEP #2: THE MORNING OF YOUR SURGERY     1. Repeat Step #1.    * Not to be used by people allergic to Chlorhexidine.        [facility] Pre-Op Instructions (Hysterectomy)  Getting Ready for Surgery: What You Need to Know!    The Day Before Surgery   Have someone drive you to the hospital or surgery center  o You cannot drive for at least 24 hours.  Please arrange for someone to drive you home after surgery.     Look over your medication with your doctor  o Certain medications such as Aspirin, blood thinners, and herbal  medications, such as Clevelands Wort must be stopped up to 7 days before surgery.  Make sure your doctors are aware of ALL medications that you take including over-the-counter and herbal medications.  Some medications will need to be taken the morning of surgery with a sip of water.     Remove nail polish and/or artificial nails before the surgery  o During surgery, we will need to track your vitals with a monitor that will be placed on your finger. The monitor will not work properly if you are wearing nail polish or artificial nails   Do not eat AFTER 9 pm the day before your surgery  o In general, you can eat your normal foods the day before surgery.  However, you should not eat or drink after 9 pm the day before your surgery unless otherwise instructed by your doctor.     Do your part to prevent a wound infection  o Do not shave the area of your body where surgery will be performed 5 DAYS prior to your surgery.  When you shave, tiny cuts can happen and allow bacteria to enter into the cuts  o Do shower the evening before and morning of procedure with antibacterial soap.  o Bring clean clothing to wear home after your surgery.  o Stop smoking.  Smoking cessation can prevent wound infections.  If you currently smoke and are interested in quitting, we can provide resources for you.  The Morning of Surgery...  o Do not use lotions, creams or deodorant.  o Do not wear jewelry, makeup, hair clips or contact lenses.  Remember to remove all piercings.  If you wear glasses, dentures, or hearing aids, please bring a container to place them in during your surgery and give to a family member for safekeeping.    If you have questions prior to surgery, please contact your doctors office or the pre-op clinic at [phone].

## 2019-01-07 NOTE — ANESTHESIA PREPROCEDURE EVALUATION
2019  Raquel Rhodes is a 43 y.o., female is scheduled for total abdominal hysterectomy under GET[facility] on 19.    Review of patient's allergies indicates:   Allergen Reactions    Cinnamon analogues Anaphylaxis    Keflex [cephalexin] Anaphylaxis    Percocet [oxycodone-acetaminophen] Hives    Piroxicam Other (See Comments)     Severe nose bleeds    Prednisone Shortness Of Breath    Pumpkin Anaphylaxis     Patient Active Problem List   Diagnosis    Neuroma         Past Surgical History:   Procedure Laterality Date     SECTION      x 2    FOOT SURGERY      OPEN RELEASE W/NEUROLYSIS Left 2017    Performed [facility]Clby [facility] DPM at [facility]    TONSILLECTOMY      TUBAL LIGATION      after      Wt Readings from Last 3 Encounters:   18 93.1 kg (205 lb 4 oz)   17 82.6 kg (182 lb)   10/26/17 82.6 kg (182 lb)     Temp Readings from Last 3 Encounters:   17 36.6 °C (97.9 °F) (Oral)     BP Readings from Last 3 Encounters:   18 120/80   17 127/66   10/26/17 (!) 144/78     Pulse Readings from Last 3 Encounters:   17 74   10/26/17 87       Anesthesia Evaluation    I have reviewed the Patient Summary Reports.    I have reviewed the Nursing Notes.   I have reviewed the Medications.     Review of Systems  Anesthesia Hx:  No problems with previous Anesthesia    Social:  Non-Smoker, No Alcohol Use    Hematology/Oncology:  Hematology Normal        EENT/Dental:EENT/Dental Normal   Cardiovascular:   Exercise tolerance: good Denies Hypertension.  Denies Dysrhythmias.   Denies Angina.        Pulmonary:   Denies Shortness of breath.    Renal/:  Renal/ Normal     Hepatic/GI:  Hepatic/GI Normal    Neurological:  Neurology Normal Denies TIA.  Denies CVA. Denies Seizures.    Endocrine:  Endocrine Normal        Physical Exam  General:  Obesity     Airway/Jaw/Neck:  Airway Findings: Mouth Opening: Normal Tongue: Normal  General Airway Assessment: Adult  Mallampati: II  TM Distance: Normal, at least 6 cm         Dental:  Dental Findings: In tact    Chest/Lungs:  Chest/Lungs Findings: Normal Respiratory Rate, Clear to auscultation     Heart/Vascular:  Heart Findings: Rate: Normal  Rhythm: Regular Rhythm  Sounds: Normal  Heart murmur: negative       Mental Status:  Mental Status Findings:  Cooperative, Alert and Oriented       No results found for: WBC, HGB, HCT, MCV, PLT    Chemistry    No results found for: NA, K, CL, CO2, BUN, CREATININE, GLU No results found for: CALCIUM, ALKPHOS, AST, ALT, BILITOT, ESTGFRAFRICA, EGFRNONAA         Anesthesia Plan  Type of Anesthesia, risks & benefits discussed:  Anesthesia Type:  general  Patient's Preference:   Intra-op Monitoring Plan:   Intra-op Monitoring Plan Comments:   Post Op Pain Control Plan:   Post Op Pain Control Plan Comments:   Induction:    Beta Blocker:  Patient is not currently on a Beta-Blocker (No further documentation required).       Informed Consent: Patient understands risks and agrees with Anesthesia plan.  Questions answered. Anesthesia consent signed with patient.  ASA Score: 2     Day of Surgery Review of History & Physical: I have interviewed and examined the patient. I have reviewed the patient's H&P dated:  There are no significant changes.  H&P update referred to the provider.  H&P completed by Anesthesiologist.   Anesthesia Plan Notes: Anesthesia consent to be signed prior to procedure on 1/9/19          Ready For Surgery From Anesthesia Perspective.

## 2019-01-07 NOTE — TELEPHONE ENCOUNTER
Number given was not a good number to contact [facility] [facility]. Obtained number from back of card and status of authorization was pending. The insurance company requests more documented information from office visits and labs. Informed them that there is only 1 visit on file and no known labs have been done by . Representative informed me that without more information the patient's surgery will not be approved. Will f/u with pre service.

## 2019-01-07 NOTE — PRE-PROCEDURE INSTRUCTIONS
[facility] [phone] - Significant other    Allergies, medical, surgical, family and psychosocial histories reviewed with patient. Periop plan of care reviewed. Preop instructions given, including medications to take and to hold. Hibiclens soap and instructions on use given. Time allotted for questions to be addressed.  Patient verbalized understanding.

## 2019-01-07 NOTE — TELEPHONE ENCOUNTER
----- Message from Makayla Jordan sent at 1/7/2019  9:49 AM CST -----  Contact: Navya @ [facility] [phone]  Navya called for a Prior Authorization for a procedure. Please call for more info and advise.     Please call Navya back at [phone]

## 2019-01-08 NOTE — TELEPHONE ENCOUNTER
The patient's hysterectomy will not be approved due to lack of clinical information. Patient wanted to know if we could still move forward with surgery but instead proceed with endometrial ablation. Benefits were verified for this procedure and there is no prior authorization required. Patient will come in on Wednesday at 8:30 to see Dr. Gann and discuss endometrial ablation.

## 2019-01-09 ENCOUNTER — OFFICE VISIT (OUTPATIENT)
Dept: OBSTETRICS AND GYNECOLOGY | Facility: CLINIC | Age: 44
End: 2019-01-09
Payer: MEDICAID

## 2019-01-09 ENCOUNTER — TELEPHONE (OUTPATIENT)
Dept: OBSTETRICS AND GYNECOLOGY | Facility: CLINIC | Age: 44
End: 2019-01-09

## 2019-01-09 ENCOUNTER — ANESTHESIA (OUTPATIENT)
Dept: SURGERY | Facility: HOSPITAL | Age: 44
End: 2019-01-09
Payer: MEDICAID

## 2019-01-09 ENCOUNTER — HOSPITAL ENCOUNTER (OUTPATIENT)
Facility: HOSPITAL | Age: 44
Discharge: HOME | End: 2019-01-09
Attending: OBSTETRICS & GYNECOLOGY | Admitting: OBSTETRICS & GYNECOLOGY
Payer: MEDICAID

## 2019-01-09 VITALS
HEIGHT: 60 IN | RESPIRATION RATE: 16 BRPM | HEART RATE: 97 BPM | SYSTOLIC BLOOD PRESSURE: 115 MMHG | BODY MASS INDEX: 39.66 KG/M2 | DIASTOLIC BLOOD PRESSURE: 59 MMHG | WEIGHT: 202 LBS | TEMPERATURE: 98 F | OXYGEN SATURATION: 96 %

## 2019-01-09 DIAGNOSIS — N92.0 MENORRHAGIA WITH REGULAR CYCLE: Primary | ICD-10-CM

## 2019-01-09 DIAGNOSIS — Z01.818 PREOPERATIVE EXAM FOR GYNECOLOGIC SURGERY: ICD-10-CM

## 2019-01-09 DIAGNOSIS — N92.0 MENORRHAGIA: ICD-10-CM

## 2019-01-09 LAB
B-HCG UR QL: NEGATIVE
CTP QC/QA: YES

## 2019-01-09 PROCEDURE — 36000707: Performed by: OBSTETRICS & GYNECOLOGY

## 2019-01-09 PROCEDURE — S0077 INJECTION, CLINDAMYCIN PHOSP: HCPCS | Performed by: NURSE ANESTHETIST, CERTIFIED REGISTERED

## 2019-01-09 PROCEDURE — 88305 TISSUE EXAM BY PATHOLOGIST: CPT | Mod: 26,,, | Performed by: PATHOLOGY

## 2019-01-09 PROCEDURE — 37000009 HC ANESTHESIA EA ADD 15 MINS: Performed by: OBSTETRICS & GYNECOLOGY

## 2019-01-09 PROCEDURE — 25000003 PHARM REV CODE 250: Performed by: OBSTETRICS & GYNECOLOGY

## 2019-01-09 PROCEDURE — 63600175 PHARM REV CODE 636 W HCPCS: Performed by: ANESTHESIOLOGY

## 2019-01-09 PROCEDURE — 71000015 HC POSTOP RECOV 1ST HR: Performed by: OBSTETRICS & GYNECOLOGY

## 2019-01-09 PROCEDURE — 25000003 PHARM REV CODE 250: Performed by: NURSE PRACTITIONER

## 2019-01-09 PROCEDURE — 25000003 PHARM REV CODE 250: Performed by: NURSE ANESTHETIST, CERTIFIED REGISTERED

## 2019-01-09 PROCEDURE — 99999 PR PBB SHADOW E&M-EST. PATIENT-LVL I: CPT | Mod: PBBFAC,,, | Performed by: OBSTETRICS & GYNECOLOGY

## 2019-01-09 PROCEDURE — 63600175 PHARM REV CODE 636 W HCPCS: Performed by: NURSE ANESTHETIST, CERTIFIED REGISTERED

## 2019-01-09 PROCEDURE — 71000033 HC RECOVERY, INTIAL HOUR: Performed by: OBSTETRICS & GYNECOLOGY

## 2019-01-09 PROCEDURE — 81025 URINE PREGNANCY TEST: CPT | Performed by: OBSTETRICS & GYNECOLOGY

## 2019-01-09 PROCEDURE — 00952 ANES VAG PX HYSTSC&/HSG: CPT | Performed by: OBSTETRICS & GYNECOLOGY

## 2019-01-09 PROCEDURE — 58563 HYSTEROSCOPY ABLATION: CPT | Mod: ,,, | Performed by: OBSTETRICS & GYNECOLOGY

## 2019-01-09 PROCEDURE — 99999 PR PBB SHADOW E&M-EST. PATIENT-LVL I: ICD-10-PCS | Mod: PBBFAC,,, | Performed by: OBSTETRICS & GYNECOLOGY

## 2019-01-09 PROCEDURE — 36000706: Performed by: OBSTETRICS & GYNECOLOGY

## 2019-01-09 PROCEDURE — 88305 TISSUE EXAM BY PATHOLOGIST: CPT | Performed by: PATHOLOGY

## 2019-01-09 PROCEDURE — 99499 NO LOS: ICD-10-PCS | Mod: S$PBB,,, | Performed by: OBSTETRICS & GYNECOLOGY

## 2019-01-09 PROCEDURE — 99499 UNLISTED E&M SERVICE: CPT | Mod: S$PBB,,, | Performed by: OBSTETRICS & GYNECOLOGY

## 2019-01-09 PROCEDURE — 58563 PR HYSTEROSCOPY,W/ENDOMETRIAL ABLATION: ICD-10-PCS | Mod: ,,, | Performed by: OBSTETRICS & GYNECOLOGY

## 2019-01-09 PROCEDURE — 88305 TISSUE SPECIMEN TO PATHOLOGY - SURGERY: ICD-10-PCS | Mod: 26,,, | Performed by: PATHOLOGY

## 2019-01-09 PROCEDURE — 37000008 HC ANESTHESIA 1ST 15 MINUTES: Performed by: OBSTETRICS & GYNECOLOGY

## 2019-01-09 PROCEDURE — 71000016 HC POSTOP RECOV ADDL HR: Performed by: OBSTETRICS & GYNECOLOGY

## 2019-01-09 PROCEDURE — 99211 OFF/OP EST MAY X REQ PHY/QHP: CPT | Mod: PBBFAC,PO,25 | Performed by: OBSTETRICS & GYNECOLOGY

## 2019-01-09 PROCEDURE — 27201423 OPTIME MED/SURG SUP & DEVICES STERILE SUPPLY: Performed by: OBSTETRICS & GYNECOLOGY

## 2019-01-09 PROCEDURE — 63600175 PHARM REV CODE 636 W HCPCS

## 2019-01-09 RX ORDER — NEOSTIGMINE METHYLSULFATE 1 MG/ML
INJECTION, SOLUTION INTRAVENOUS
Status: DISCONTINUED | OUTPATIENT
Start: 2019-01-09 | End: 2019-01-09

## 2019-01-09 RX ORDER — HYDROMORPHONE HYDROCHLORIDE 2 MG/ML
INJECTION, SOLUTION INTRAMUSCULAR; INTRAVENOUS; SUBCUTANEOUS
Status: COMPLETED
Start: 2019-01-09 | End: 2019-01-09

## 2019-01-09 RX ORDER — PHENYLEPHRINE HYDROCHLORIDE 10 MG/ML
INJECTION INTRAVENOUS
Status: DISCONTINUED | OUTPATIENT
Start: 2019-01-09 | End: 2019-01-09

## 2019-01-09 RX ORDER — GLYCOPYRROLATE 0.2 MG/ML
INJECTION INTRAMUSCULAR; INTRAVENOUS
Status: DISCONTINUED | OUTPATIENT
Start: 2019-01-09 | End: 2019-01-09

## 2019-01-09 RX ORDER — ROCURONIUM BROMIDE 10 MG/ML
INJECTION, SOLUTION INTRAVENOUS
Status: DISCONTINUED | OUTPATIENT
Start: 2019-01-09 | End: 2019-01-09

## 2019-01-09 RX ORDER — LIDOCAINE HYDROCHLORIDE 10 MG/ML
1 INJECTION, SOLUTION EPIDURAL; INFILTRATION; INTRACAUDAL; PERINEURAL ONCE
Status: DISCONTINUED | OUTPATIENT
Start: 2019-01-09 | End: 2019-01-09 | Stop reason: HOSPADM

## 2019-01-09 RX ORDER — TRAMADOL HYDROCHLORIDE 50 MG/1
50 TABLET ORAL EVERY 6 HOURS PRN
Status: DISCONTINUED | OUTPATIENT
Start: 2019-01-09 | End: 2019-01-09 | Stop reason: HOSPADM

## 2019-01-09 RX ORDER — CLINDAMYCIN PHOSPHATE 900 MG/50ML
INJECTION, SOLUTION INTRAVENOUS
Status: DISCONTINUED | OUTPATIENT
Start: 2019-01-09 | End: 2019-01-09

## 2019-01-09 RX ORDER — SODIUM CHLORIDE, SODIUM LACTATE, POTASSIUM CHLORIDE, CALCIUM CHLORIDE 600; 310; 30; 20 MG/100ML; MG/100ML; MG/100ML; MG/100ML
INJECTION, SOLUTION INTRAVENOUS CONTINUOUS
Status: DISCONTINUED | OUTPATIENT
Start: 2019-01-09 | End: 2019-01-09 | Stop reason: HOSPADM

## 2019-01-09 RX ORDER — PROPOFOL 10 MG/ML
VIAL (ML) INTRAVENOUS
Status: DISCONTINUED | OUTPATIENT
Start: 2019-01-09 | End: 2019-01-09

## 2019-01-09 RX ORDER — FENTANYL CITRATE 50 UG/ML
INJECTION, SOLUTION INTRAMUSCULAR; INTRAVENOUS
Status: DISCONTINUED | OUTPATIENT
Start: 2019-01-09 | End: 2019-01-09

## 2019-01-09 RX ORDER — TRAMADOL HYDROCHLORIDE 50 MG/1
50 TABLET ORAL EVERY 4 HOURS PRN
Qty: 40 TABLET | Refills: 0 | Status: SHIPPED | OUTPATIENT
Start: 2019-01-09 | End: 2019-01-20 | Stop reason: SDUPTHER

## 2019-01-09 RX ORDER — MIDAZOLAM HYDROCHLORIDE 1 MG/ML
INJECTION, SOLUTION INTRAMUSCULAR; INTRAVENOUS
Status: DISCONTINUED | OUTPATIENT
Start: 2019-01-09 | End: 2019-01-09

## 2019-01-09 RX ORDER — SUCCINYLCHOLINE CHLORIDE 20 MG/ML
INJECTION INTRAMUSCULAR; INTRAVENOUS
Status: DISCONTINUED | OUTPATIENT
Start: 2019-01-09 | End: 2019-01-09

## 2019-01-09 RX ORDER — ONDANSETRON 2 MG/ML
INJECTION INTRAMUSCULAR; INTRAVENOUS
Status: DISCONTINUED | OUTPATIENT
Start: 2019-01-09 | End: 2019-01-09

## 2019-01-09 RX ORDER — HYDROMORPHONE HYDROCHLORIDE 2 MG/ML
0.5 INJECTION, SOLUTION INTRAMUSCULAR; INTRAVENOUS; SUBCUTANEOUS EVERY 5 MIN PRN
Status: COMPLETED | OUTPATIENT
Start: 2019-01-09 | End: 2019-01-09

## 2019-01-09 RX ORDER — FLUOXETINE HYDROCHLORIDE 40 MG/1
CAPSULE ORAL
COMMUNITY
Start: 2018-11-09 | End: 2019-01-31

## 2019-01-09 RX ORDER — LIDOCAINE HCL/PF 100 MG/5ML
SYRINGE (ML) INTRAVENOUS
Status: DISCONTINUED | OUTPATIENT
Start: 2019-01-09 | End: 2019-01-09

## 2019-01-09 RX ORDER — TRAMADOL HYDROCHLORIDE 50 MG/1
TABLET ORAL
Refills: 0 | COMMUNITY
Start: 2018-12-06 | End: 2020-07-14

## 2019-01-09 RX ORDER — ONDANSETRON 8 MG/1
8 TABLET, ORALLY DISINTEGRATING ORAL EVERY 8 HOURS PRN
Status: DISCONTINUED | OUTPATIENT
Start: 2019-01-09 | End: 2019-01-09 | Stop reason: HOSPADM

## 2019-01-09 RX ADMIN — HYDROMORPHONE HYDROCHLORIDE 0.5 MG: 2 INJECTION INTRAMUSCULAR; INTRAVENOUS; SUBCUTANEOUS at 01:01

## 2019-01-09 RX ADMIN — NEOSTIGMINE METHYLSULFATE 2 MG: 1 INJECTION INTRAVENOUS at 01:01

## 2019-01-09 RX ADMIN — ROCURONIUM BROMIDE 15 MG: 10 INJECTION, SOLUTION INTRAVENOUS at 12:01

## 2019-01-09 RX ADMIN — ONDANSETRON 4 MG: 2 INJECTION, SOLUTION INTRAMUSCULAR; INTRAVENOUS at 01:01

## 2019-01-09 RX ADMIN — PROPOFOL 150 MG: 10 INJECTION, EMULSION INTRAVENOUS at 12:01

## 2019-01-09 RX ADMIN — PHENYLEPHRINE HYDROCHLORIDE 50 MCG: 10 INJECTION INTRAVENOUS at 12:01

## 2019-01-09 RX ADMIN — ROCURONIUM BROMIDE 5 MG: 10 INJECTION, SOLUTION INTRAVENOUS at 12:01

## 2019-01-09 RX ADMIN — FENTANYL CITRATE 150 MCG: 50 INJECTION, SOLUTION INTRAMUSCULAR; INTRAVENOUS at 12:01

## 2019-01-09 RX ADMIN — CLINDAMYCIN PHOSPHATE 900 MG: 18 INJECTION, SOLUTION INTRAVENOUS at 12:01

## 2019-01-09 RX ADMIN — MIDAZOLAM 2 MG: 1 INJECTION INTRAMUSCULAR; INTRAVENOUS at 12:01

## 2019-01-09 RX ADMIN — GLYCOPYRROLATE 0.4 MG: 0.2 INJECTION, SOLUTION INTRAMUSCULAR; INTRAVENOUS at 01:01

## 2019-01-09 RX ADMIN — SODIUM CHLORIDE, SODIUM LACTATE, POTASSIUM CHLORIDE, AND CALCIUM CHLORIDE: .6; .31; .03; .02 INJECTION, SOLUTION INTRAVENOUS at 12:01

## 2019-01-09 RX ADMIN — SUCCINYLCHOLINE CHLORIDE 120 MG: 20 INJECTION, SOLUTION INTRAMUSCULAR; INTRAVENOUS at 12:01

## 2019-01-09 RX ADMIN — ONDANSETRON 8 MG: 8 TABLET, ORALLY DISINTEGRATING ORAL at 02:01

## 2019-01-09 RX ADMIN — LIDOCAINE HYDROCHLORIDE 80 MG: 20 INJECTION, SOLUTION INTRAVENOUS at 12:01

## 2019-01-09 NOTE — ANESTHESIA POSTPROCEDURE EVALUATION
Anesthesia Post Evaluation    Patient: Raquel Rhodes    Procedure(s) Performed: Procedure(s) (LRB):  HYSTEROSCOPY, WITH DILATION AND CURETTAGE OF UTERUS (N/A)  ABLATION, ENDOMETRIUM (N/A)    Final Anesthesia Type: general  Patient location during evaluation: PACU  Patient participation: Yes- Able to Participate  Level of consciousness: awake  Post-procedure vital signs: reviewed and stable  Pain management: adequate  Airway patency: patent  PONV status at discharge: No PONV  Anesthetic complications: no      Cardiovascular status: stable  Respiratory status: unassisted  Hydration status: euvolemic  Follow-up not needed.        Visit Vitals  BP (!) 115/59   Pulse 97   Temp 36.9 °C (98.4 °F) (Skin)   Resp 16   Ht 5' (1.524 m)   Wt 91.6 kg (202 lb)   LMP 12/31/2018   SpO2 96%   Breastfeeding? No   BMI 39.45 kg/m²       Pain/Sheron Score: Pain Rating Prior to Med Admin: 4 (1/9/2019  1:59 PM)  Sheron Score: 10 (1/9/2019  2:53 PM)

## 2019-01-09 NOTE — PROGRESS NOTES
Patient presents to the office today for preop consents and will go directly to the hospital for surgical procedure. Originally the patient was planning an endometrial ablation but on consideration decided to proceed with hysterectomy.  In the process of preparing for this surgery was not authorized by her insurance company.  We have gone back to the original plan of endometrial ablation and will also do hysteroscopy and dilatation and curettage for therapeutic and diagnostic benefits since there will not be a tissue sample otherwise.  Patient's history and physical a been reviewed and lab work will be updated as needed.  Plan will be to have the patient have her ablation procedure today and be home this afternoon.  All questions were answered and she understands the plan.  No interval changes in H&P since last encounter.

## 2019-01-09 NOTE — OP NOTE
Operative note:    Date of procedure January 09/2019  Surgeon Alfred Gann MD  Assistant Francia Chiang  Diagnosis menorrhagia (pathology report pending)  Procedure hysteroscopy; D and C; endometrial ablation [employer]  Estimated blood loss less 30 cc  Anesthesia general  Procedure detail:  Patient brought the operating room and placed under general anesthesia for vaginal procedures.  The bladder Was emptied with a straight red catheter the patient was draped and a weighted speculum was placed in the vagina and the anterior lip of the cervix was grasped with a single-tooth tenaculum  uterus was sounded to 9-1/2 cm the internal os dilated and the hysteroscope was introduced without difficulty.  Visualization and photographic documentation was done.  An anterior flat /fibroid was noted as well as a uterine polyp some endometrial irregularity of the contour.  Scope was withdrawn and dilatation and curettage was done.  Upon completion of the dilatation curettage, the ablation device was introduced into the operative field and inserted and set to proper uterine dimensions.  Ablation cycle was completed without difficulty and reinspection with hysteroscope showed good ablated uterus.  At this point procedure ended patient was reversed from anesthesia and taken to the recovery area in stable and satisfactory condition without apparent complications from the surgery or the anesthesia

## 2019-01-09 NOTE — ANESTHESIA RELEASE NOTE
Anesthesia Release from PACU Note    Patient: Raquel Rhodes    Procedure(s) Performed: Procedure(s) (LRB):  HYSTEROSCOPY, WITH DILATION AND CURETTAGE OF UTERUS (N/A)  ABLATION, ENDOMETRIUM (N/A)    Anesthesia type: general    Post pain: Adequate analgesia    Post assessment: no apparent anesthetic complications    Last Vitals:   Visit Vitals  BP (!) 115/59   Pulse 97   Temp 36.9 °C (98.4 °F) (Skin)   Resp 16   Ht 5' (1.524 m)   Wt 91.6 kg (202 lb)   LMP 12/31/2018   SpO2 96%   Breastfeeding? No   BMI 39.45 kg/m²       Post vital signs: stable    Level of consciousness: awake    Nausea/Vomiting: no nausea/no vomiting    Complications: none    Airway Patency: patent    Respiratory: unassisted, spontaneous ventilation    Cardiovascular: stable and blood pressure at baseline    Hydration: euvolemic

## 2019-01-09 NOTE — H&P
Preoperative history and physical:     Chief complaint is menorrhagia.    History of present illness:  Patient has worsening menses which are inhibiting her ability to function normally during her menstrual cycle.  She requests management for this and declines medical treatment and medical treatments tried in the past have not been successful.  Menstrual cycles are regular but extremely heavy.  Past medical history:  Patient has no significant medical problems. Surgical history includes foot surgery, tonsillectomy, and 2  sections.  She is a .  Patient has allergies to Keflex, Percocet, senna min and pump can per ox a Bart and prednisone.  Patient presently is on oral medications which will be resumed after her surgery.  Review of systems is essentially negative with exception of her gyn problems and still having discomfort in her ankle after her foot surgery in 2018  Physical examination:  Height 5 ft 1, weight 93 kg, afebrile, blood pressure 130/78  HEENT exam is normal  Chest is clear to auscultation and percussion  Heart normal sinus rhythm  Abdomen soft without organomegaly or rebound tenderness,  section scars  Extremities normal without hyperreflexia or edema  Pelvic exam:  Normal external female genitalia, normal vaginal exam with normal appearing cervix, uterus is normal size shape position by palpation with no adnexal masses  Impression:  Menorrhagia with regular cycles  Plan:  D and C, hysteroscopy, endometrial ablation.

## 2019-01-09 NOTE — TELEPHONE ENCOUNTER
----- Message from Maren Zapien sent at 1/9/2019  8:26 AM CST -----  Contact: Self [phone]  Patient is running late. Please advise.

## 2019-01-09 NOTE — BRIEF OP NOTE
Dx: menorrhagia  Proc: hysteroscopy; D&C; endometrial ablation  Gen anesth  EBL 20cc  MD Crystal Amaya asst  Dictation to follow

## 2019-01-09 NOTE — TRANSFER OF CARE
Anesthesia Transfer of Care Note    Patient: Raquel Rhodes    Procedure(s) Performed: Procedure(s) (LRB):  HYSTEROSCOPY, WITH DILATION AND CURETTAGE OF UTERUS (N/A)  ABLATION, ENDOMETRIUM (N/A)    Patient location: PACU    Anesthesia Type: general    Transport from OR: Transported from OR on 6-10 L/min O2 by face mask with adequate spontaneous ventilation    Post pain: adequate analgesia    Post assessment: no apparent anesthetic complications and tolerated procedure well    Post vital signs: stable    Level of consciousness: awake    Nausea/Vomiting: no nausea/vomiting    Complications: none    Transfer of care protocol was followed      Last vitals:   Visit Vitals  BP (!) 140/72   Pulse 96   Temp 36.5 °C (97.7 °F) (Skin)   Resp 19   Ht 5' (1.524 m)   Wt 91.6 kg (202 lb)   LMP 12/31/2018   SpO2 97%   Breastfeeding? No   BMI 39.45 kg/m²

## 2019-01-09 NOTE — DISCHARGE INSTRUCTIONS
***  BATHING:                   You may shower after your dressing is removed, but no tub baths, hot tubs, saunas or swimming until you see the doctor.    ACTIVITY LEVEL: If you have received sedation or an anesthetic, you may feel sleepy for   several hours. Rest until you are more awake. Gradually resume your normal activities  ? No heavy lifting or straining, nothing over 10 lbs., like a gallon of milk.  ? Pelvic rest- no sex, tampons or douching until follow up or instructed by doctor.  DIET:  You may resume your home diet. If nausea is present, increase your diet gradually with fluids and bland foods.    Medications:  Pain medication should be taken only if needed and as directed. If antibiotics are prescribed, the medication should be taken until completed. You will be given an updated list of you medications.  ? No driving, alcoholic beverages or signing legal documents for next 24 hours or while taking pain medication    CALL THE DOCTOR:    For any obvious bleeding (some dried blood over the incision is normal).      Redness, swelling, foul smell around incision or fever over 101.   Shortness of breath, Coughing up Bloody Sputum or Pains or Swelling in your calves.   Persistent pain or nausea not relieved by medication.   If vaginal bleeding is in excess of a normal period.   Problems urinating    If any unusual problems or difficulties occur contact your doctor. If you cannot contact your doctor but feel your signs and symptoms warrant a physicians attention return to the emergency room.

## 2019-01-10 NOTE — DISCHARGE SUMMARY
Admit Date:  January 9, 2019    Discharge Date:Same    Attending Physician:MD Jose Luis    Procedures:  Hysteroscopy, dilatation and curettage, endometrial ablation    Admit Diagnosis:  Menorrhagia with endometrial polyp  Discharge Diagnosis: Same plus uterine leiomyoma    Hospital course: Afebrile and vital signs stable throughout course. Routine progressive care.  No nausea/vomiting.    Discharged Condition: Improving    Disposition: Discharge to home or self care    Patient Instructions:   Pelvic rest x 4-6 weeks  Follow up to weeks  No heavy lifting  Call for excessive vaginal bleeding, temperature greater than 100.6, redness or increasing pain to incision.  Discharge Medications: Given to patient or in chart     Diet: Regular

## 2019-01-11 ENCOUNTER — TELEPHONE (OUTPATIENT)
Dept: OBSTETRICS AND GYNECOLOGY | Facility: CLINIC | Age: 44
End: 2019-01-11

## 2019-01-11 NOTE — TELEPHONE ENCOUNTER
----- Message from Sakshi Hua sent at 1/11/2019 11:13 AM CST -----  Contact: [phone]/self  Patient is requesting a call back regarding scheduling her post op appointment. Thanks

## 2019-01-21 RX ORDER — TRAMADOL HYDROCHLORIDE 50 MG/1
50 TABLET ORAL EVERY 4 HOURS PRN
Qty: 40 TABLET | Refills: 0 | Status: SHIPPED | OUTPATIENT
Start: 2019-01-21 | End: 2019-01-31

## 2019-01-22 ENCOUNTER — TELEPHONE (OUTPATIENT)
Dept: OBSTETRICS AND GYNECOLOGY | Facility: CLINIC | Age: 44
End: 2019-01-22

## 2019-01-22 NOTE — TELEPHONE ENCOUNTER
----- Message from [employer], System Message sent at 1/22/2019  2:24 PM CST -----      Appointment Request From: Raquel Rhodes      With Provider: Alfred Gann MD [[place] - OB/GYN]      Preferred Date Range: 1/23/2019 - 1/23/2019      Preferred Times: Any time      Reason for visit: Existing Patient      Comments:   having still alot of pain from surgery 2nd attemp please call

## 2019-01-24 ENCOUNTER — TELEPHONE (OUTPATIENT)
Dept: OBSTETRICS AND GYNECOLOGY | Facility: CLINIC | Age: 44
End: 2019-01-24

## 2019-01-24 NOTE — TELEPHONE ENCOUNTER
----- Message from Madeline Ortiz sent at 1/24/2019 11:39 AM CST -----  No. [phone]    Patient had surgery on 1/9/19.  She is having pain in her stomach.   Please call.

## 2019-01-29 ENCOUNTER — HOSPITAL ENCOUNTER (EMERGENCY)
Facility: HOSPITAL | Age: 44
Discharge: HOME | End: 2019-01-29
Attending: EMERGENCY MEDICINE
Payer: MEDICAID

## 2019-01-29 VITALS
SYSTOLIC BLOOD PRESSURE: 138 MMHG | RESPIRATION RATE: 18 BRPM | DIASTOLIC BLOOD PRESSURE: 72 MMHG | OXYGEN SATURATION: 97 % | HEART RATE: 74 BPM | HEIGHT: 60 IN | BODY MASS INDEX: 39.66 KG/M2 | TEMPERATURE: 98 F | WEIGHT: 202 LBS

## 2019-01-29 DIAGNOSIS — R10.2 PELVIC PAIN: Primary | ICD-10-CM

## 2019-01-29 LAB
ANION GAP SERPL CALC-SCNC: 9 MMOL/L
BACTERIA #/AREA URNS HPF: ABNORMAL /HPF
BASOPHILS # BLD AUTO: 0.03 K/UL
BASOPHILS NFR BLD: 0.4 %
BILIRUB UR QL STRIP: NEGATIVE
BUN SERPL-MCNC: 9 MG/DL
CALCIUM SERPL-MCNC: 9.2 MG/DL
CHLORIDE SERPL-SCNC: 102 MMOL/L
CLARITY UR: ABNORMAL
CO2 SERPL-SCNC: 25 MMOL/L
COLOR UR: ABNORMAL
CREAT SERPL-MCNC: 0.7 MG/DL
DIFFERENTIAL METHOD: ABNORMAL
EOSINOPHIL # BLD AUTO: 0.1 K/UL
EOSINOPHIL NFR BLD: 1.4 %
ERYTHROCYTE [DISTWIDTH] IN BLOOD BY AUTOMATED COUNT: 14.1 %
EST. GFR  (AFRICAN AMERICAN): >60 ML/MIN/1.73 M^2
EST. GFR  (NON AFRICAN AMERICAN): >60 ML/MIN/1.73 M^2
GLUCOSE SERPL-MCNC: 85 MG/DL
GLUCOSE UR QL STRIP: NEGATIVE
HCT VFR BLD AUTO: 40.8 %
HGB BLD-MCNC: 13.2 G/DL
HGB UR QL STRIP: ABNORMAL
KETONES UR QL STRIP: NEGATIVE
LEUKOCYTE ESTERASE UR QL STRIP: ABNORMAL
LYMPHOCYTES # BLD AUTO: 1.6 K/UL
LYMPHOCYTES NFR BLD: 22.2 %
MCH RBC QN AUTO: 25.8 PG
MCHC RBC AUTO-ENTMCNC: 32.4 G/DL
MCV RBC AUTO: 80 FL
MICROSCOPIC COMMENT: ABNORMAL
MONOCYTES # BLD AUTO: 0.3 K/UL
MONOCYTES NFR BLD: 4.3 %
NEUTROPHILS # BLD AUTO: 5.1 K/UL
NEUTROPHILS NFR BLD: 71.4 %
NITRITE UR QL STRIP: NEGATIVE
PH UR STRIP: 6 [PH] (ref 5–8)
PLATELET # BLD AUTO: 368 K/UL
PMV BLD AUTO: 9 FL
POTASSIUM SERPL-SCNC: 4.2 MMOL/L
PROT UR QL STRIP: ABNORMAL
RBC # BLD AUTO: 5.11 M/UL
RBC #/AREA URNS HPF: 5 /HPF (ref 0–4)
SODIUM SERPL-SCNC: 136 MMOL/L
SP GR UR STRIP: 1.02 (ref 1–1.03)
URN SPEC COLLECT METH UR: ABNORMAL
UROBILINOGEN UR STRIP-ACNC: NEGATIVE EU/DL
WBC # BLD AUTO: 7.2 K/UL
WBC #/AREA URNS HPF: 3 /HPF (ref 0–5)

## 2019-01-29 PROCEDURE — 25000003 PHARM REV CODE 250: Performed by: NURSE PRACTITIONER

## 2019-01-29 PROCEDURE — 80048 BASIC METABOLIC PNL TOTAL CA: CPT

## 2019-01-29 PROCEDURE — 96361 HYDRATE IV INFUSION ADD-ON: CPT

## 2019-01-29 PROCEDURE — 81000 URINALYSIS NONAUTO W/SCOPE: CPT

## 2019-01-29 PROCEDURE — 99284 EMERGENCY DEPT VISIT MOD MDM: CPT | Mod: 25

## 2019-01-29 PROCEDURE — 85025 COMPLETE CBC W/AUTO DIFF WBC: CPT

## 2019-01-29 PROCEDURE — 63600175 PHARM REV CODE 636 W HCPCS: Performed by: NURSE PRACTITIONER

## 2019-01-29 PROCEDURE — 96374 THER/PROPH/DIAG INJ IV PUSH: CPT

## 2019-01-29 PROCEDURE — 96375 TX/PRO/DX INJ NEW DRUG ADDON: CPT

## 2019-01-29 RX ORDER — ONDANSETRON 4 MG/1
4 TABLET, ORALLY DISINTEGRATING ORAL EVERY 8 HOURS PRN
Qty: 10 TABLET | Refills: 0 | Status: SHIPPED | OUTPATIENT
Start: 2019-01-29 | End: 2019-02-02 | Stop reason: SDUPTHER

## 2019-01-29 RX ORDER — HYDROCODONE BITARTRATE AND ACETAMINOPHEN 5; 325 MG/1; MG/1
1 TABLET ORAL EVERY 6 HOURS PRN
Qty: 10 TABLET | Refills: 0 | Status: SHIPPED | OUTPATIENT
Start: 2019-01-29 | End: 2020-07-14

## 2019-01-29 RX ORDER — ONDANSETRON 2 MG/ML
4 INJECTION INTRAMUSCULAR; INTRAVENOUS
Status: COMPLETED | OUTPATIENT
Start: 2019-01-29 | End: 2019-01-29

## 2019-01-29 RX ORDER — MORPHINE SULFATE 4 MG/ML
4 INJECTION, SOLUTION INTRAMUSCULAR; INTRAVENOUS
Status: COMPLETED | OUTPATIENT
Start: 2019-01-29 | End: 2019-01-29

## 2019-01-29 RX ADMIN — SODIUM CHLORIDE 1000 ML: 0.9 INJECTION, SOLUTION INTRAVENOUS at 02:01

## 2019-01-29 RX ADMIN — MORPHINE SULFATE 4 MG: 4 INJECTION INTRAVENOUS at 02:01

## 2019-01-29 RX ADMIN — ONDANSETRON 4 MG: 2 INJECTION INTRAMUSCULAR; INTRAVENOUS at 02:01

## 2019-01-29 NOTE — ED NOTES
Pt had D&C on 1/9/19 and has been having increased abdominal cramping with intermittent episodes of heavy to light bleeding.  Denies fever or nausea/vomiting.

## 2019-01-29 NOTE — ED PROVIDER NOTES
Encounter Date: 1/29/2019       History     Chief Complaint   Patient presents with    Post-op Problem     Reports had D&C on the 9th and since then has been having increased abdominal cramping with intermittent episodes of heavy to light bleeding.      43-year-old female presents to the ED for pelvic pain with vaginal bleeding.  On 01/09/2019 the patient had a D & C for treatment of heavy periods with clots.  She reports that since time she has had pelvic cramping which is intermittent.  She also reports bleeding which varies from heavy to light.  She is not passing tissue or clots.  She denies fever, dysuria, vomiting. She reports that she has used about 5 pads every 24 hr consistently.  She was supposed to follow up with her gynecologist but was referred to the ED by the office staff for increasing pain.  No other complaints at this time.      The history is provided by the patient.   Female  Problem   Primary symptoms include pelvic pain, vaginal bleeding.    Primary symptoms include no fever, no dysuria.   This is a new problem. The current episode started several weeks ago. The problem occurs constantly. The problem has been gradually improving. The symptoms occur spontaneously. The patient's menstrual history has been regular. Associated symptoms include nausea and frequency. Pertinent negatives include no anorexia, no abdominal pain, no constipation, no vomiting, no light-headedness and no dizziness. There is no concern regarding sexually transmitted diseases. Associated medical issues include gynecological surgery.     Review of patient's allergies indicates:   Allergen Reactions    Cinnamon analogues Anaphylaxis    Keflex [cephalexin] Anaphylaxis    Percocet [oxycodone-acetaminophen] Hives    Piroxicam Other (See Comments)     Severe nose bleeds    Prednisone Shortness Of Breath    Pumpkin Anaphylaxis     History reviewed. No pertinent past medical history.  Past Surgical History:   Procedure  Laterality Date    ABLATION, ENDOMETRIUM N/A 2019    [facility]     SECTION      x 2    FOOT SURGERY      HYSTEROSCOPY, WITH DILATION AND CURETTAGE OF UTERUS N/A 2019    [facility] at [facility] OR    OPEN RELEASE W/NEUROLYSIS Left 2017    [facility] by [facility] D[facility] at [facility] OR    TONSILLECTOMY      TUBAL LIGATION      after      History reviewed. No pertinent family history.  Social History     Tobacco Use    Smoking status: Never Smoker    Smokeless tobacco: Never Used   Substance Use Topics    Alcohol use: No    Drug use: Not on file     Review of Systems   Constitutional: Positive for activity change. Negative for appetite change, chills and fever.   HENT: Negative for congestion.    Respiratory: Negative for cough and shortness of breath.    Cardiovascular: Negative for chest pain.   Gastrointestinal: Positive for nausea. Negative for abdominal pain, anorexia, constipation and vomiting.   Endocrine: Positive for polyuria.   Genitourinary: Positive for frequency, pelvic pain and vaginal bleeding. Negative for dysuria and vaginal discharge.   Musculoskeletal: Negative for back pain.   Skin: Negative for rash.   Allergic/Immunologic: Negative for immunocompromised state.   Neurological: Negative for dizziness and light-headedness.   Hematological: Does not bruise/bleed easily.   Psychiatric/Behavioral: Negative for confusion.       Physical Exam     Initial Vitals [19 1343]   BP Pulse Resp Temp SpO2   135/79 89 17 98.3 °F (36.8 °C) 96 %      MAP       --         Physical Exam    Nursing note and vitals reviewed.  Constitutional: Vital signs are normal. She appears well-developed and well-nourished. She is active and cooperative.  Non-toxic appearance. She does not have a sickly appearance. She does not appear ill.   HENT:   Head: Normocephalic and atraumatic.   Eyes: Conjunctivae and EOM are normal.   Neck:  Normal range of motion. Neck supple.   Cardiovascular: Normal rate and regular rhythm.   Pulmonary/Chest: Effort normal and breath sounds normal.   Abdominal: Soft. Normal appearance and bowel sounds are normal. She exhibits no distension. There is tenderness in the suprapubic area. There is no rigidity, no rebound, no guarding and no CVA tenderness.   Genitourinary: Pelvic exam was performed with patient supine. Uterus is tender. Uterus is not deviated, not enlarged and not fixed. Cervix exhibits no motion tenderness, no discharge and no friability. Right adnexum displays no mass, no tenderness and no fullness. Left adnexum displays no mass, no tenderness and no fullness. There is bleeding (small amound dark brown) in the vagina. No erythema or tenderness in the vagina. No vaginal discharge found.   Genitourinary Comments: Witnessed by nurse Kathy tech   Neurological: She is alert and oriented to person, place, and time. She has normal strength. GCS eye subscore is 4. GCS verbal subscore is 5. GCS motor subscore is 6.   Skin: Skin is warm, dry and intact. No bruising and no rash noted. No pallor.   Psychiatric: She has a normal mood and affect. Her speech is normal and behavior is normal. Judgment and thought content normal. Cognition and memory are normal.         ED Course   Procedures  Labs Reviewed   URINALYSIS, REFLEX TO URINE CULTURE - Abnormal; Notable for the following components:       Result Value    Color, UA Brown (*)     Appearance, UA Hazy (*)     Protein, UA Trace (*)     Occult Blood UA 2+ (*)     Leukocytes, UA 1+ (*)     All other components within normal limits    Narrative:     Preferred Collection Type->Urine, Clean Catch   CBC W/ AUTO DIFFERENTIAL - Abnormal; Notable for the following components:    MCV 80 (*)     MCH 25.8 (*)     Platelets 368 (*)     MPV 9.0 (*)     All other components within normal limits   URINALYSIS MICROSCOPIC - Abnormal; Notable for the following components:    RBC,  UA 5 (*)     All other components within normal limits    Narrative:     Preferred Collection Type->Urine, Clean Catch   BASIC METABOLIC PANEL          Imaging Results          US Pelvis Comp with Transvag NON-OB (xpd (Final result)  Result time 01/29/19 15:10:55    Final result by Daniel Macedo DO (01/29/19 15:10:55)                 Impression:      Small nonspecific fluid within the endometrial cavity with endometrium measuring approximately 7 mm in thickness.  No evidence for vascularity of the endometrium to suggest retained products of conception.  Clinical correlation advised.    1.8 cm posterior fundal subserosal fibroid    Small caliber right ovary.  Left ovary not seen.  No adnexal mass or free fluid in the pelvis.      Electronically signed by: Daniel Macedo DO  Date:    01/29/2019  Time:    15:10             Narrative:    EXAMINATION:  US PELVIS COMP WITH TRANSVAG NON-OB (XPD)    CLINICAL HISTORY:  pelvic pain;    TECHNIQUE:  Trans abdominal and transvaginal ultrasound of the pelvis.    COMPARISON:  None.    FINDINGS:  The uterus measures 8.0 cm in length by 4.7 x 4.6 cm in transverse dimension.  The endometrium measures approximately the 7 mm in thickness allowing for nonspecific fluid in the endometrial canal.  No evidence for color flow within the endometrium to suggest retained products.    Few incidental nabothian cysts identified.    There is a somewhat heterogeneous hypoechoic focus emanating from the central distal posterior uterine fundus measuring 1.7 x 1.6 x 1.8 cm most compatible with subserosal fibroid.    Right ovary measures 1.9 by 0.9 x 1.6 cm.    Left ovary not seen.    There is vascular flow identified in the right ovary.    No adnexal mass or free fluid in the pelvis.                                 Medical Decision Making:   Initial Assessment:   42yo female here for continued pelvic pain and vaginal bleeding since D&C on 1/9/19.  Pt appears well, nontoxic.  Vitals stable.  Mild  suprapubic TTP on exam.  No r/r/g, distention, or CVA tenderness.   Differential Diagnosis:   Post-op pain, perforation, anemia, infection   Clinical Tests:   Lab Tests: Ordered and Reviewed  Radiological Study: Ordered and Reviewed  ED Management:  Labs, IV fluids, ultrasound  UA negative for infection. H&H stable. WBC normal. US shows nonspecific fluid in the endometrial cavity.  No adnexal tenderness on exam. Uterus mildly tender.  I feel the pt has expected post-op pain.  No signs of infection.  Pt reports that she feels much better after IV fluids and morphine.  She is tolerating PO fluids without difficulty.  Encouraged increased fluid intake.  Advised f/u with OB/GYN within 3 days.   I reviewed strict return precautions. In addition, pt is to return to the ED if condition changes, progresses, or if there are any concerns.  Pt verbalized understanding, compliance, and agreement with the treatment plan.  Reviewed narcotic prescription precautions.   RX norco and zofran odt                   ED Course as of Jan 29 1400   Tue Jan 29, 2019   1345 This is a SORT/MSE of a 43 y.o. female presenting to the ED with c/o post-op problem. Patient had D&C and ablation on January 9th. Patient reports abdominal cramping and intermittent bleeding. Care will be transferred to an alternate provider when patient is roomed for a full evaluation and final disposition. NIKOLE Berry-BC 1:45 PM   [CH]      ED Course User Index  [CH] Jarett Kohli NP     Clinical Impression:   The encounter diagnosis was Pelvic pain.                             Diamante Hopson, NIKOLE  01/29/19 1298

## 2019-01-30 ENCOUNTER — TELEPHONE (OUTPATIENT)
Dept: OBSTETRICS AND GYNECOLOGY | Facility: CLINIC | Age: 44
End: 2019-01-30

## 2019-01-30 NOTE — TELEPHONE ENCOUNTER
Patient had D&C done on 01/09. Was seen in the ED yesterday for pelvic pain and bleeding. Ultrasound did show fibroid. Was told to follow up with GYN within 3 days to discuss results and further management. Patient scheduled for visit tomorrow at 1400. All questions answered and patient verbalized understanding.

## 2019-01-30 NOTE — TELEPHONE ENCOUNTER
----- Message from Maren Zapien sent at 1/30/2019 10:16 AM CST -----  Contact: Self [phone]  Patient would like to speak with you about being seen within 3 days for a hospital follow up. Please advise

## 2019-01-31 ENCOUNTER — OFFICE VISIT (OUTPATIENT)
Dept: OBSTETRICS AND GYNECOLOGY | Facility: CLINIC | Age: 44
End: 2019-01-31
Payer: MEDICAID

## 2019-01-31 VITALS
DIASTOLIC BLOOD PRESSURE: 74 MMHG | BODY MASS INDEX: 39.22 KG/M2 | HEIGHT: 60 IN | SYSTOLIC BLOOD PRESSURE: 120 MMHG | WEIGHT: 199.75 LBS

## 2019-01-31 DIAGNOSIS — N30.90 CYSTITIS: ICD-10-CM

## 2019-01-31 DIAGNOSIS — G89.18 POSTOPERATIVE PAIN: Primary | ICD-10-CM

## 2019-01-31 PROCEDURE — 87077 CULTURE AEROBIC IDENTIFY: CPT

## 2019-01-31 PROCEDURE — 99999 PR PBB SHADOW E&M-EST. PATIENT-LVL III: ICD-10-PCS | Mod: PBBFAC,,, | Performed by: OBSTETRICS & GYNECOLOGY

## 2019-01-31 PROCEDURE — 99024 PR POST-OP FOLLOW-UP VISIT: ICD-10-PCS | Mod: ,,, | Performed by: OBSTETRICS & GYNECOLOGY

## 2019-01-31 PROCEDURE — 99213 OFFICE O/P EST LOW 20 MIN: CPT | Mod: PBBFAC,PO | Performed by: OBSTETRICS & GYNECOLOGY

## 2019-01-31 PROCEDURE — 99999 PR PBB SHADOW E&M-EST. PATIENT-LVL III: CPT | Mod: PBBFAC,,, | Performed by: OBSTETRICS & GYNECOLOGY

## 2019-01-31 PROCEDURE — 87070 CULTURE OTHR SPECIMN AEROBIC: CPT

## 2019-01-31 PROCEDURE — 99024 POSTOP FOLLOW-UP VISIT: CPT | Mod: ,,, | Performed by: OBSTETRICS & GYNECOLOGY

## 2019-01-31 RX ORDER — DOXYCYCLINE 100 MG/1
100 CAPSULE ORAL 2 TIMES DAILY
Qty: 20 CAPSULE | Refills: 0 | Status: SHIPPED | OUTPATIENT
Start: 2019-01-31 | End: 2019-02-11 | Stop reason: SDUPTHER

## 2019-01-31 RX ORDER — SULFAMETHOXAZOLE AND TRIMETHOPRIM 800; 160 MG/1; MG/1
1 TABLET ORAL 2 TIMES DAILY
Qty: 20 TABLET | Refills: 1 | Status: SHIPPED | OUTPATIENT
Start: 2019-01-31 | End: 2019-02-11 | Stop reason: SDUPTHER

## 2019-01-31 RX ORDER — PHENAZOPYRIDINE HYDROCHLORIDE 200 MG/1
200 TABLET, FILM COATED ORAL
Qty: 30 TABLET | Refills: 1 | Status: SHIPPED | OUTPATIENT
Start: 2019-01-31 | End: 2019-02-11

## 2019-01-31 NOTE — PROGRESS NOTES
Patient returns early for a postop visit.  She had a D&C with hysteroscopy and ablation and since that time has passed some blood clots but not vaginal hemorrhage.  She has also had some pelvic cramping more to the left and the right.  She went to the emergency room her white blood cell count was normal and her blood count was good.  No imaging was done at that time.  Patient had a urinalysis done although not treated for it, it suggests urinary tract infection.    Examination today shows normal external female genitalia normal vaginal canal.  There is a slight whitish discharge present and a culture was done.  There is no purulent exudate at the cervix.  Bimanual examination shows no adnexal masses and an anteflexed uterus similar in size to that found at operation.  Cervical movement results in mild tenderness but compression of the bladder bimanual examination is worse discomfort.  This suggests a bladder origin for her pain and discomfort and the patient does volunteer that she is urinating more frequently than she usually does.  She will be started on antibiotics and also will be given medication for bladder spasm.    Patient return approximately 10 days call if symptomatology worsens.  She also has postop pain medication home which she take if needed.

## 2019-02-01 PROCEDURE — 96374 THER/PROPH/DIAG INJ IV PUSH: CPT

## 2019-02-01 PROCEDURE — 96361 HYDRATE IV INFUSION ADD-ON: CPT

## 2019-02-01 PROCEDURE — 99285 EMERGENCY DEPT VISIT HI MDM: CPT | Mod: 25

## 2019-02-02 ENCOUNTER — HOSPITAL ENCOUNTER (EMERGENCY)
Facility: HOSPITAL | Age: 44
Discharge: HOME | End: 2019-02-02
Attending: EMERGENCY MEDICINE
Payer: MEDICAID

## 2019-02-02 VITALS
RESPIRATION RATE: 16 BRPM | HEART RATE: 76 BPM | WEIGHT: 202 LBS | OXYGEN SATURATION: 98 % | TEMPERATURE: 99 F | HEIGHT: 60 IN | DIASTOLIC BLOOD PRESSURE: 56 MMHG | BODY MASS INDEX: 39.66 KG/M2 | SYSTOLIC BLOOD PRESSURE: 107 MMHG

## 2019-02-02 DIAGNOSIS — R11.0 NAUSEA: Primary | ICD-10-CM

## 2019-02-02 LAB
ALBUMIN SERPL BCP-MCNC: 3.6 G/DL
ALP SERPL-CCNC: 103 U/L
ALT SERPL W/O P-5'-P-CCNC: 15 U/L
ANION GAP SERPL CALC-SCNC: 10 MMOL/L
AST SERPL-CCNC: 15 U/L
BACTERIA #/AREA URNS HPF: ABNORMAL /HPF
BASOPHILS # BLD AUTO: 0.03 K/UL
BASOPHILS NFR BLD: 0.4 %
BILIRUB SERPL-MCNC: 0.4 MG/DL
BILIRUB UR QL STRIP: NEGATIVE
BUN SERPL-MCNC: 11 MG/DL
CALCIUM SERPL-MCNC: 9.2 MG/DL
CHLORIDE SERPL-SCNC: 103 MMOL/L
CLARITY UR: CLEAR
CO2 SERPL-SCNC: 24 MMOL/L
COLOR UR: ABNORMAL
CREAT SERPL-MCNC: 0.9 MG/DL
DIFFERENTIAL METHOD: ABNORMAL
EOSINOPHIL # BLD AUTO: 0.1 K/UL
EOSINOPHIL NFR BLD: 1.1 %
ERYTHROCYTE [DISTWIDTH] IN BLOOD BY AUTOMATED COUNT: 13.9 %
EST. GFR  (AFRICAN AMERICAN): >60 ML/MIN/1.73 M^2
EST. GFR  (NON AFRICAN AMERICAN): >60 ML/MIN/1.73 M^2
GLUCOSE SERPL-MCNC: 92 MG/DL
GLUCOSE UR QL STRIP: NEGATIVE
HCT VFR BLD AUTO: 39.2 %
HGB BLD-MCNC: 12.7 G/DL
HGB UR QL STRIP: ABNORMAL
KETONES UR QL STRIP: NEGATIVE
LEUKOCYTE ESTERASE UR QL STRIP: ABNORMAL
LIPASE SERPL-CCNC: 15 U/L
LYMPHOCYTES # BLD AUTO: 1.9 K/UL
LYMPHOCYTES NFR BLD: 22.9 %
MCH RBC QN AUTO: 26 PG
MCHC RBC AUTO-ENTMCNC: 32.4 G/DL
MCV RBC AUTO: 80 FL
MICROSCOPIC COMMENT: ABNORMAL
MONOCYTES # BLD AUTO: 0.6 K/UL
MONOCYTES NFR BLD: 7.8 %
NEUTROPHILS # BLD AUTO: 5.5 K/UL
NEUTROPHILS NFR BLD: 67.7 %
NITRITE UR QL STRIP: POSITIVE
PH UR STRIP: 7 [PH] (ref 5–8)
PLATELET # BLD AUTO: 369 K/UL
PMV BLD AUTO: 9.1 FL
POTASSIUM SERPL-SCNC: 4.1 MMOL/L
PROT SERPL-MCNC: 7.6 G/DL
PROT UR QL STRIP: NEGATIVE
RBC # BLD AUTO: 4.89 M/UL
RBC #/AREA URNS HPF: 10 /HPF (ref 0–4)
SODIUM SERPL-SCNC: 137 MMOL/L
SP GR UR STRIP: 1.01 (ref 1–1.03)
URN SPEC COLLECT METH UR: ABNORMAL
UROBILINOGEN UR STRIP-ACNC: 1 EU/DL
WBC # BLD AUTO: 8.07 K/UL
WBC #/AREA URNS HPF: 6 /HPF (ref 0–5)

## 2019-02-02 PROCEDURE — 25000003 PHARM REV CODE 250: Performed by: EMERGENCY MEDICINE

## 2019-02-02 PROCEDURE — 81000 URINALYSIS NONAUTO W/SCOPE: CPT

## 2019-02-02 PROCEDURE — 80053 COMPREHEN METABOLIC PANEL: CPT

## 2019-02-02 PROCEDURE — 63600175 PHARM REV CODE 636 W HCPCS: Performed by: EMERGENCY MEDICINE

## 2019-02-02 PROCEDURE — 25500020 PHARM REV CODE 255: Performed by: EMERGENCY MEDICINE

## 2019-02-02 PROCEDURE — 83690 ASSAY OF LIPASE: CPT

## 2019-02-02 PROCEDURE — 85025 COMPLETE CBC W/AUTO DIFF WBC: CPT

## 2019-02-02 RX ORDER — ONDANSETRON 4 MG/1
4 TABLET, ORALLY DISINTEGRATING ORAL EVERY 6 HOURS PRN
Qty: 24 TABLET | Refills: 0 | Status: SHIPPED | OUTPATIENT
Start: 2019-02-02 | End: 2020-07-14

## 2019-02-02 RX ORDER — DICYCLOMINE HYDROCHLORIDE 20 MG/1
20 TABLET ORAL 2 TIMES DAILY
Qty: 20 TABLET | Refills: 0 | Status: SHIPPED | OUTPATIENT
Start: 2019-02-02 | End: 2019-03-04

## 2019-02-02 RX ORDER — ONDANSETRON 2 MG/ML
4 INJECTION INTRAMUSCULAR; INTRAVENOUS
Status: COMPLETED | OUTPATIENT
Start: 2019-02-02 | End: 2019-02-02

## 2019-02-02 RX ADMIN — SODIUM CHLORIDE 1000 ML: 0.9 INJECTION, SOLUTION INTRAVENOUS at 01:02

## 2019-02-02 RX ADMIN — IOHEXOL 100 ML: 350 INJECTION, SOLUTION INTRAVENOUS at 03:02

## 2019-02-02 RX ADMIN — ONDANSETRON 4 MG: 2 INJECTION INTRAMUSCULAR; INTRAVENOUS at 01:02

## 2019-02-02 NOTE — ED PROVIDER NOTES
Encounter Date: 2019    SCRIBE #1 NOTE: I, Danny Crowder, am scribing for, and in the presence of,  Dr. Reilly. I have scribed the entire note.       History     Chief Complaint   Patient presents with    Abdominal Pain     s/p D&C with ablation . worsening pain, distention since surgery. + nausea. denies dysuria. currently being treated for uti but pt. states she is not improving. seen in ED earlier this week and seen by Dr. Gann yesterday.      Time seen by provider: 1:08 AM    This is a 43 y.o. female who presents with complaint of upper abdominal pain beginning today, also lower abdominal pain/distension, and nausea s/p D&C with ablation on . Patient denies any fever, chills, vomiting, diarrhea, dysuria, or any other concerning symptoms. She is currently being treated with antibiotics for a UTI, but she states she is not improving. Patient was seen in the ED earlier this week and was seen by Dr. Gann yesterday for similar complaints.      The history is provided by the patient.     Review of patient's allergies indicates:   Allergen Reactions    Cinnamon analogues Anaphylaxis    Keflex [cephalexin] Anaphylaxis    Percocet [oxycodone-acetaminophen] Hives    Piroxicam Other (See Comments)     Severe nose bleeds    Prednisone Shortness Of Breath    Pumpkin Anaphylaxis     No past medical history on file.  Past Surgical History:   Procedure Laterality Date    ABLATION, ENDOMETRIUM N/A 2019    Performed by [facility] at [facility] OR     SECTION      x 2    FOOT SURGERY      HYSTEROSCOPY, WITH DILATION AND CURETTAGE OF UTERUS N/A 2019    Performed by [facility] at [facility] OR    OPEN RELEASE W/NEUROLYSIS Left 2017    [facility] by [facility] D[facility] at [facility] OR    TONSILLECTOMY      TUBAL LIGATION      after      No family history on file.  Social History     Tobacco Use    Smoking status: Never Smoker    Smokeless tobacco: Never Used   Substance Use  Topics    Alcohol use: No    Drug use: Not on file     Review of Systems   Constitutional: Negative for chills and fever.   HENT: Negative for facial swelling and trouble swallowing.    Eyes: Negative for redness.   Respiratory: Negative for shortness of breath.    Cardiovascular: Negative for chest pain.   Gastrointestinal: Positive for abdominal distention, abdominal pain and nausea. Negative for diarrhea and vomiting.   Genitourinary: Negative for dysuria and hematuria.   Musculoskeletal: Negative for gait problem.   Skin: Negative for rash.   Neurological: Negative for facial asymmetry and speech difficulty.     Physical Exam     Initial Vitals [02/01/19 2335]   BP Pulse Resp Temp SpO2   (!) 145/85 92 20 98.3 °F (36.8 °C) 97 %      MAP       --         Physical Exam    Nursing note and vitals reviewed.  Constitutional: She appears well-developed and well-nourished. She is not diaphoretic. No distress.   HENT:   Head: Normocephalic and atraumatic.   Eyes: Conjunctivae and EOM are normal.   Neck: Normal range of motion. Neck supple.   Cardiovascular: Normal rate, regular rhythm and normal heart sounds.   Pulmonary/Chest: Breath sounds normal. No respiratory distress.   Abdominal: Soft. There is no tenderness. There is no guarding.   Musculoskeletal: Normal range of motion. She exhibits no edema or tenderness.   Neurological: She is alert and oriented to person, place, and time. She has normal strength.   Skin: Skin is warm and dry. Capillary refill takes less than 2 seconds.       ED Course   Procedures  Labs Reviewed   CBC W/ AUTO DIFFERENTIAL - Abnormal; Notable for the following components:       Result Value    MCV 80 (*)     MCH 26.0 (*)     Platelets 369 (*)     MPV 9.1 (*)     All other components within normal limits   URINALYSIS, REFLEX TO URINE CULTURE - Abnormal; Notable for the following components:    Color, UA Orange (*)     Occult Blood UA 1+ (*)     Nitrite, UA Positive (*)     Leukocytes, UA  Trace (*)     All other components within normal limits    Narrative:     Preferred Collection Type->Urine, Clean Catch   URINALYSIS MICROSCOPIC - Abnormal; Notable for the following components:    RBC, UA 10 (*)     WBC, UA 6 (*)     All other components within normal limits    Narrative:     Preferred Collection Type->Urine, Clean Catch   COMPREHENSIVE METABOLIC PANEL   LIPASE          X-Rays:   Independently Interpreted Readings:   Other Readings:  Reviewed by myself, read by radiology.    Imaging Results          CT Abdomen Pelvis With Contrast (Final result)  Result time 02/02/19 03:36:38    Final result by Gary Tom MD (02/02/19 03:36:38)                 Impression:      1.  No CT evidence of acute intra-abdominal abnormality.    2.  Small volume of fluid within the endometrial cavity as seen on prior ultrasound exam.  No pelvic fluid collection or adnexal masses.    3.  Lobular uterine contour consistent with fibroids.      Electronically signed by: Gary Tom MD  Date:    02/02/2019  Time:    03:36             Narrative:    EXAMINATION:  CT ABDOMEN PELVIS WITH CONTRAST    CLINICAL HISTORY:  Infection, abdomen-pelvis;    TECHNIQUE:  Low dose axial images, sagittal and coronal reformations were obtained from the lung bases to the pubic symphysis following the IV administration of 100 mL of Omnipaque 350 .  Oral contrast was not given.    COMPARISON:  Pelvic ultrasound 01/29/2019    FINDINGS:  The lung bases are unremarkable.  There is no pleural fluid present.  The visualized portions of the heart appear normal.    The liver is normal in size and attenuation with no focal hepatic abnormality.  The gallbladder shows no evidence of stones or pericholecystic fluid.  There is no intra-or extrahepatic biliary ductal dilatation.    The stomach, spleen, pancreas, and adrenal glands are unremarkable.    The kidneys are normal in size and location and enhance symmetrically.  There is no evidence of  hydronephrosis. The urinary bladder is unremarkable. The uterus demonstrates a lobular contour suggestive of fibroids.  There is a small volume of fluid present within the endometrial cavity as seen on prior ultrasound examination.  A few small foci of air present within the upper 3rd of the vagina and at the level of the cervix.  No significant free fluid within the pelvis or discrete pelvic fluid collection to suggest abscess.  No adnexal masses identified.    The abdominal aorta is normal in course and caliber without significant atherosclerotic calcifications.    The visualized loops of small and large bowel show no evidence of obstruction or inflammation.  The appendix is not definitively visualized, however no focal inflammatory change is seen at its expected location.  There is no ascites, free intraperitoneal air, or portal venous gas.    When viewed with bone windows the osseous structures are unremarkable.  The extraperitoneal soft tissues are unremarkable.                              Medical Decision Making:   Clinical Tests:   Lab Tests: Ordered and Reviewed  Radiological Study: Ordered and Reviewed  ED Management:  Pt has negative work up. Likely suffering abd cramps and nausea due to uti and abx. Will dc w bentyl and zofran. W/u f pmd as needed              Attending Attestation:           Physician Attestation for Scribe:  Physician Attestation Statement for Scribe #1: I, boston beavers, reviewed documentation, as scribed by cira ponce in my presence, and it is both accurate and complete.                    Clinical Impression:     1. Nausea        Disposition:   Disposition: Discharged  Condition: Stable            Boston Beavers MD  02/02/19 [id number]

## 2019-02-05 LAB — BACTERIA SPEC AEROBE CULT: NORMAL

## 2019-02-06 RX ORDER — TRAMADOL HYDROCHLORIDE 50 MG/1
50 TABLET ORAL EVERY 4 HOURS PRN
Qty: 40 TABLET | Refills: 0 | Status: SHIPPED | OUTPATIENT
Start: 2019-02-06 | End: 2020-07-14

## 2019-02-06 NOTE — TELEPHONE ENCOUNTER
Patient had D&C and Ablation done on 01/09. Requesting refill of Tramadol.    Please sign order if approved.

## 2019-02-07 RX ORDER — HYDROCODONE BITARTRATE AND ACETAMINOPHEN 5; 325 MG/1; MG/1
1 TABLET ORAL EVERY 6 HOURS PRN
Qty: 10 TABLET | Refills: 0 | Status: CANCELLED | OUTPATIENT
Start: 2019-02-07

## 2019-02-11 ENCOUNTER — OFFICE VISIT (OUTPATIENT)
Dept: OBSTETRICS AND GYNECOLOGY | Facility: CLINIC | Age: 44
End: 2019-02-11
Payer: MEDICAID

## 2019-02-11 VITALS
DIASTOLIC BLOOD PRESSURE: 88 MMHG | BODY MASS INDEX: 39.78 KG/M2 | WEIGHT: 202.63 LBS | SYSTOLIC BLOOD PRESSURE: 126 MMHG | HEIGHT: 60 IN

## 2019-02-11 DIAGNOSIS — Z98.890 POSTOPERATIVE STATE: Primary | ICD-10-CM

## 2019-02-11 PROCEDURE — 99024 POSTOP FOLLOW-UP VISIT: CPT | Mod: ,,, | Performed by: OBSTETRICS & GYNECOLOGY

## 2019-02-11 PROCEDURE — 99024 PR POST-OP FOLLOW-UP VISIT: ICD-10-PCS | Mod: ,,, | Performed by: OBSTETRICS & GYNECOLOGY

## 2019-02-11 PROCEDURE — 99213 OFFICE O/P EST LOW 20 MIN: CPT | Mod: PBBFAC,PO | Performed by: OBSTETRICS & GYNECOLOGY

## 2019-02-11 PROCEDURE — 99999 PR PBB SHADOW E&M-EST. PATIENT-LVL III: CPT | Mod: PBBFAC,,, | Performed by: OBSTETRICS & GYNECOLOGY

## 2019-02-11 PROCEDURE — 99999 PR PBB SHADOW E&M-EST. PATIENT-LVL III: ICD-10-PCS | Mod: PBBFAC,,, | Performed by: OBSTETRICS & GYNECOLOGY

## 2019-02-11 RX ORDER — DOXYCYCLINE 100 MG/1
100 CAPSULE ORAL 2 TIMES DAILY
Qty: 14 CAPSULE | Refills: 0 | Status: SHIPPED | OUTPATIENT
Start: 2019-02-11 | End: 2019-02-18

## 2019-02-11 RX ORDER — SULFAMETHOXAZOLE AND TRIMETHOPRIM 800; 160 MG/1; MG/1
1 TABLET ORAL 2 TIMES DAILY
Qty: 14 TABLET | Refills: 0 | Status: SHIPPED | OUTPATIENT
Start: 2019-02-11 | End: 2019-02-18

## 2019-02-11 NOTE — PROGRESS NOTES
Patient returns after ablation.  She has had a relatively unexpected difficult course postop.  She has no signs of complication other than urinary tract infection and intermittent sharp cramping pain.  There is no evidence of leakage, fistulization, excessive bleeding, etc.  Examination in the emergency room with Imaging failed to show kidney stone or other abnormalities.  Examination in the office today shows tenderness in the bladder area on bimanual examination but no tenderness on compression of the fundus or movement of the cervix.  There are no adnexal masses and no adnexal fullness.  Patient is finishing her antibiotic course today and will be continued for 1 more week.  Pelvic rest will continue for another 10-14 days.  If the patient is not improving slowly but steadily she will return to the office for another follow-up visit.  If she is doing well return in 3 months for checkup.

## 2019-02-14 RX ORDER — HYDROCODONE BITARTRATE AND ACETAMINOPHEN 5; 325 MG/1; MG/1
1 TABLET ORAL EVERY 6 HOURS PRN
Qty: 10 TABLET | Refills: 0 | Status: CANCELLED | OUTPATIENT
Start: 2019-02-07

## 2019-02-15 RX ORDER — HYDROCODONE BITARTRATE AND ACETAMINOPHEN 5; 325 MG/1; MG/1
1 TABLET ORAL EVERY 6 HOURS PRN
Qty: 10 TABLET | Refills: 0 | Status: CANCELLED | OUTPATIENT
Start: 2019-02-07

## 2019-02-21 ENCOUNTER — TELEPHONE (OUTPATIENT)
Dept: OBSTETRICS AND GYNECOLOGY | Facility: CLINIC | Age: 44
End: 2019-02-21

## 2019-02-21 NOTE — TELEPHONE ENCOUNTER
She is having cramping and started bleeding again.  She had ablation on January 9th.  Please advise.

## 2019-02-21 NOTE — TELEPHONE ENCOUNTER
Okay to refill medication of choice for the cramping.  Set patient up with office visit next week.

## 2019-02-21 NOTE — TELEPHONE ENCOUNTER
----- Message from Makayla Jordan sent at 2/21/2019 12:49 PM CST -----  Contact: [phone]/self  Pt would like a callback concerning symptoms post surgery. Please call

## 2019-02-27 ENCOUNTER — OFFICE VISIT (OUTPATIENT)
Dept: OBSTETRICS AND GYNECOLOGY | Facility: CLINIC | Age: 44
End: 2019-02-27
Payer: MEDICAID

## 2019-02-27 VITALS
SYSTOLIC BLOOD PRESSURE: 110 MMHG | BODY MASS INDEX: 39.69 KG/M2 | HEIGHT: 60 IN | WEIGHT: 202.19 LBS | DIASTOLIC BLOOD PRESSURE: 80 MMHG

## 2019-02-27 DIAGNOSIS — Z09 POSTOP CHECK: Primary | ICD-10-CM

## 2019-02-27 PROCEDURE — 99024 POSTOP FOLLOW-UP VISIT: CPT | Mod: ,,, | Performed by: OBSTETRICS & GYNECOLOGY

## 2019-02-27 PROCEDURE — 99213 OFFICE O/P EST LOW 20 MIN: CPT | Mod: PBBFAC,PO | Performed by: OBSTETRICS & GYNECOLOGY

## 2019-02-27 PROCEDURE — 99999 PR PBB SHADOW E&M-EST. PATIENT-LVL III: ICD-10-PCS | Mod: PBBFAC,,, | Performed by: OBSTETRICS & GYNECOLOGY

## 2019-02-27 PROCEDURE — 99024 PR POST-OP FOLLOW-UP VISIT: ICD-10-PCS | Mod: ,,, | Performed by: OBSTETRICS & GYNECOLOGY

## 2019-02-27 PROCEDURE — 99999 PR PBB SHADOW E&M-EST. PATIENT-LVL III: CPT | Mod: PBBFAC,,, | Performed by: OBSTETRICS & GYNECOLOGY

## 2019-02-27 RX ORDER — NORETHINDRONE ACETATE AND ETHINYL ESTRADIOL 1MG-20(21)
1 KIT ORAL DAILY
Qty: 28 TABLET | Refills: 11 | Status: SHIPPED | OUTPATIENT
Start: 2019-02-27 | End: 2020-07-14

## 2019-02-27 NOTE — PROGRESS NOTES
Patient returns for another postop check.  She is still having significant dysmenorrhea and very heavy.  Last week following ablation.  The pathology report and operative reports were reviewed.  Patient was counseled that attempt will be made to synchronized the uterine lining despite a good report on the curettings with dyssynchrony.  Last visits bladder infection has been successfully treated with the use the antibiotics prescribed.  No other GI or  issues.  Plan will be to start oral contraceptive pills this  and continue them for 3 months.  If the problem is not resolved or after the 3 month course of OCPs the cramping and bleeding returns, patient will be set up for hysterectomy.  She has had a previous tubal ligation as well as 2  sections and the hysterectomy would be done through previous  scar as a menstrual disorder with failed ablation.

## 2019-05-21 NOTE — ADDENDUM NOTE
Addended by: CARMENZA KLEIN on: 2/27/2019 09:44 AM     Modules accepted: [place]    
[FreeTextEntry1] : WELL VISIT 19 YEAR OLD, will be attending [place] for OT, need titers for forms

## 2019-07-30 ENCOUNTER — OFFICE VISIT (OUTPATIENT)
Dept: URGENT CARE | Facility: CLINIC | Age: 44
End: 2019-07-30
Payer: MEDICAID

## 2019-07-30 VITALS
DIASTOLIC BLOOD PRESSURE: 86 MMHG | BODY MASS INDEX: 39.85 KG/M2 | HEIGHT: 60 IN | TEMPERATURE: 98 F | WEIGHT: 203 LBS | SYSTOLIC BLOOD PRESSURE: 138 MMHG | HEART RATE: 81 BPM | OXYGEN SATURATION: 98 % | RESPIRATION RATE: 20 BRPM

## 2019-07-30 DIAGNOSIS — S09.90XA INJURY OF HEAD, INITIAL ENCOUNTER: ICD-10-CM

## 2019-07-30 DIAGNOSIS — Z23 NEED FOR TDAP VACCINATION: ICD-10-CM

## 2019-07-30 DIAGNOSIS — S01.81XA LACERATION OF FOREHEAD, INITIAL ENCOUNTER: Primary | ICD-10-CM

## 2019-07-30 PROCEDURE — 12011 RPR F/E/E/N/L/M 2.5 CM/<: CPT | Mod: S$GLB,,, | Performed by: PHYSICIAN ASSISTANT

## 2019-07-30 PROCEDURE — 90471 TDAP VACCINE GREATER THAN OR EQUAL TO 7YO IM: ICD-10-PCS | Mod: S$GLB,,, | Performed by: FAMILY MEDICINE

## 2019-07-30 PROCEDURE — 12011 LACERATION REPAIR: ICD-10-PCS | Mod: S$GLB,,, | Performed by: PHYSICIAN ASSISTANT

## 2019-07-30 PROCEDURE — 90715 TDAP VACCINE GREATER THAN OR EQUAL TO 7YO IM: ICD-10-PCS | Mod: S$GLB,,, | Performed by: FAMILY MEDICINE

## 2019-07-30 PROCEDURE — 99203 PR OFFICE/OUTPT VISIT, NEW, LEVL III, 30-44 MIN: ICD-10-PCS | Mod: S$GLB,,, | Performed by: PHYSICIAN ASSISTANT

## 2019-07-30 PROCEDURE — 99203 OFFICE O/P NEW LOW 30 MIN: CPT | Mod: S$GLB,,, | Performed by: PHYSICIAN ASSISTANT

## 2019-07-30 PROCEDURE — 90715 TDAP VACCINE 7 YRS/> IM: CPT | Mod: S$GLB,,, | Performed by: FAMILY MEDICINE

## 2019-07-30 PROCEDURE — 90471 IMMUNIZATION ADMIN: CPT | Mod: S$GLB,,, | Performed by: FAMILY MEDICINE

## 2019-07-30 RX ORDER — HYDROXYZINE PAMOATE 25 MG/1
CAPSULE ORAL
Refills: 1 | COMMUNITY
Start: 2019-05-28 | End: 2019-07-30

## 2019-07-30 NOTE — PROGRESS NOTES
Subjective:       Patient ID: Raquel Rhodes is a 43 y.o. female.    Vitals:  height is 5' (1.524 m) and weight is 92.1 kg (203 lb). Her oral temperature is 98.2 °F (36.8 °C). Her blood pressure is 138/86 and her pulse is 81. Her respiration is 20 and oxygen saturation is 98%.     Chief Complaint: Facial Laceration (Left Forehead)    This is a 43 y.o. female who presents today with a chief complaint of laceration to the left side of forehead.  Patient states she tripped on a chair and struck her forehead on the edge of a lunch table.  Patient denies LOC.  She has a laceration to the forehead  - bleeding under control.  She has taken an Advil around noon, prior to the fall.     Laceration    The incident occurred 1 to 3 hours ago. The laceration is located on the face. The laceration mechanism was a blunt object. The pain is at a severity of 5/10. The pain is moderate. The pain has been fluctuating since onset. It is unknown if a foreign body is present. Her tetanus status is unknown.       Constitution: Negative for fatigue.   HENT: Negative for facial swelling and facial trauma.    Neck: Negative for neck stiffness.   Cardiovascular: Negative for chest trauma.   Eyes: Negative for eye trauma, double vision and blurred vision.   Gastrointestinal: Negative for abdominal trauma, abdominal pain and rectal bleeding.   Genitourinary: Negative for hematuria, missed menses, genital trauma and pelvic pain.   Musculoskeletal: Positive for pain. Negative for trauma, joint swelling and abnormal ROM of joint.   Skin: Negative for color change, wound, abrasion, laceration, erythema and bruising.   Neurological: Negative for dizziness, history of vertigo, light-headedness, coordination disturbances, altered mental status and loss of consciousness.   Hematologic/Lymphatic: Negative for history of bleeding disorder.   Psychiatric/Behavioral: Negative for altered mental status.       Objective:      Physical Exam   Constitutional:  She is oriented to person, place, and time. Vital signs are normal. She appears well-developed and well-nourished. She does not appear ill. No distress.   HENT:   Head: Normocephalic. Head is with laceration.       Right Ear: External ear normal.   Left Ear: External ear normal.   Nose: Nose normal.   Eyes: Pupils are equal, round, and reactive to light. Conjunctivae, EOM and lids are normal. Right eye exhibits no discharge. Left eye exhibits no discharge.   Neck: Normal range of motion. Neck supple.   Cardiovascular: Normal rate, regular rhythm and normal heart sounds. Exam reveals no gallop and no friction rub.   No murmur heard.  Pulmonary/Chest: Effort normal and breath sounds normal. No stridor. No respiratory distress. She has no decreased breath sounds. She has no wheezes. She has no rhonchi. She has no rales.   Musculoskeletal: Normal range of motion.   Neurological: She is alert and oriented to person, place, and time. She has normal strength. She is not disoriented. No cranial nerve deficit or sensory deficit. She displays a negative Romberg sign. GCS eye subscore is 4. GCS verbal subscore is 5. GCS motor subscore is 6.   Normal finger-to-nose test; negative pronator drift; symmetrical facial expressions   Skin: Skin is warm and dry. Laceration noted. No rash noted. She is not diaphoretic. No erythema. No pallor.   Laceration Repair  Date/Time: 7/30/2019 4:30 PM  Performed by: Harriett Shearer PA-C  Authorized by: Harriett Shearer PA-C   Body area: head/neck  Location details: forehead  Laceration length: 2 cm  Foreign bodies: no foreign bodies  Tendon involvement: none  Nerve involvement: none  Vascular damage: no  Amount of cleaning: standard  Skin closure: glue  Technique: simple  Approximation: close  Approximation difficulty: simple  Dressing: open (no dressing)  Patient tolerance: Patient tolerated the procedure well with no immediate   complications             Psychiatric: She has a normal mood and  affect. Her behavior is normal.   Nursing note and vitals reviewed.      Assessment:       1. Laceration of forehead, initial encounter    2. Need for Tdap vaccination    3. Injury of head, initial encounter        Plan:         Laceration of forehead, initial encounter  -     (In Office Administered) Tdap Vaccine  -     Laceration Repair    Need for Tdap vaccination  -     (In Office Administered) Tdap Vaccine    Injury of head, initial encounter      Patient Instructions   See attached handout for head injury wake and sleep instructions for tonight.  Do not apply ointment or pick at the glue.  See attached handout for wound care instructions.    Please follow up with your primary care provider within 2-5 days if your signs and symptoms have not resolved or worsen.     If your condition worsens or fails to improve we recommend that you receive another evaluation at the emergency room immediately or contact your primary medical clinic to discuss your concerns.   You must understand that you have received an Urgent Care treatment only and that you may be released before all of your medical problems are known or treated. You, the patient, will arrange for follow up care as instructed.         Head Injury with Sleep Monitoring (Adult)    You have a head injury. It does not appear serious at this time. But symptoms of a more serious problem, such as mild brain injury (concussion), or bruising or bleeding in the brain, may appear later. For this reason, you and someone caring for you will need to watch for the symptoms listed below. Once at home, also be sure to follow any care instructions youre given.  Home care  Watch for the following symptoms  Someone must stay with you for the next 24 hours (or longer, if directed). If you fall asleep, this person should wake you up every 2 hours to check your symptoms. This is called sleep monitoring. Symptoms to watch for include:  · Headache  · Nausea or  vomiting  · Dizziness  · Sensitivity to light or noise  · Unusual sleepiness or grogginess  · Trouble falling asleep  · Personality changes  · Vision changes  · Memory loss  · Confusion  · Trouble walking or clumsiness  · Loss of consciousness (even for a short time)  · Inability to be awakened  · Stiff neck  · Weakness or numbness in any part of the body  · Seizures  If you develop any of these symptoms, seek emergency medical medical care right away. If none of these symptoms are noted during the first 24 hours, keep watching for symptoms for the next day or so. Ask your provider if someone should stay with you during this time.   General care  · If you were prescribed medicines for pain, use them as directed. Note: Dont use other pain medicines without checking with your provider first.  · To help reduce swelling and pain, apply a cold source to the injured area for up to 20 minutes at a time. Do this as often as directed. Use a cold pack or bag of ice wrapped in a thin towel. Never apply a cold source directly to the skin.  · If you have cuts or scrapes as a result of your injury, care for them as directed.  · For the next 24 hours (or longer, if instructed):  ¨ Dont drink alcohol or use sedatives or other medicines that make you sleepy.  ¨ Dont drive or operate machinery.  ¨ Dont do anything strenuous, such as heavy lifting or straining.  ¨ Limit tasks that require concentration. This includes reading, using a smartphone or computer, watching TV, and playing video games.  ¨ Dont return to sports or other activity that could result in another head injury.  Follow-up care  Follow up with your healthcare provider, or as directed. If imaging tests were done, they will be reviewed by a doctor. You will be told the results and any new findings that may affect your care.  When to seek medical advice  Call your healthcare provider right away if any of these occur:  · Pain doesnt get better or worsens  · New or  increased swelling or bruising  · Fever of 100.4°F (38°C) or higher, or as directed by your provider  · Redness, warmth, bleeding, or drainage from the injured area  · Any depression or bony abnormality in the injured area  · Fluid drainage or bleeding from the nose or ears  Date Last Reviewed: 9/26/2015  © 5424-9751 [employer]. [address] [place] [address]. All rights reserved. This information is not intended as a substitute for professional medical care. Always follow your healthcare professional's instructions.        Face Laceration: Skin Glue  A laceration is a cut through the skin. A laceration on your face has been closed with skin glue. This is used on cuts that have smooth edges and are not infected. In some cases, a lower layer of skin may be sutured before skin glue is put on. The skin glue closes the cut within a few minutes. It also provides a water-resistant cover. No bandage is needed. Skin glue peels off on its own within 5 to 10 days.     Home care  · Your healthcare provider may prescribe an antibiotic. This is to help prevent infection. Follow all instructions for taking this medicine. Take the medicine every day until it is gone or you are told to stop. You should not have any left over.  · The healthcare provider may prescribe medicines for pain. Follow instructions for taking them.  · Follow the healthcare providers instructions on how to care for the cut.  · Keep the wound clean and dry. You may shower or bathe as usual, but do not use soaps, lotions, or ointments on the wound area. Do not scrub the wound. After bathing, pat the wound dry with a soft towel. Avoid soaking the cut in water.  · Do not scratch, rub, or pick at the film. Do not place tape directly over the film.  · Do not apply liquids (such as peroxide), ointments, or creams to the wound while the film is in place.  · Most facial skin wounds heal without problems. However, an infection sometimes  occurs despite proper treatment. Therefore, watch for the signs of infection listed below.  Follow-up care  Follow up with your health care provider as advised. Stitches should be removed from the face within 5 days. Stitches and staples should be removed from other parts of the body within 7-14 days. If dissolving stitches were used in the mouth, these will fall out or dissolve without the need for removal. If tape closures were used, remove them yourself if they have not fallen off after 7 days. If skin glue was used, the film will fall off by itself in 5-10 days. Notify your healthcare provider if you notice persistent numbness or weakness in the injured hand.  When to seek medical advice  Call your health care provider right away if any of these occur:  · Wound bleeds more than a small amount or bleeding doesn't stop  · Signs of infection:  ¨ Increasing pain in the wound  ¨ Increasing wound redness or swelling  ¨ Pus or bad odor coming from the wound  ¨ Fever of 100.4°F (38.ºC) or as directed by your healthcare provider  · Wound edges re-open  Date Last Reviewed: 6/14/2015  © 4002-1892 [employer]. [address] [place] [address], [place] [address]. All rights reserved. This information is not intended as a substitute for professional medical care. Always follow your healthcare professional's instructions.

## 2019-07-30 NOTE — LETTER
July 30, 2019      [facility]  [address]  [facility] [address]  Phone: [phone]  Fax: [phone]       Patient: Raquel Rhodes   YOB: 1975  Date of Visit: 07/30/2019    To Whom It May Concern:    Felipa Rhodes  was at [facility] on 07/30/2019. She may return to work/school on 08/01/2019 with no restrictions. If you have any questions or concerns, or if I can be of further assistance, please do not hesitate to contact me.    Sincerely,      Harriett Shearer PA-C

## 2019-07-30 NOTE — PATIENT INSTRUCTIONS
See attached handout for head injury wake and sleep instructions for tonight.  Do not apply ointment or pick at the glue.  See attached handout for wound care instructions.    Please follow up with your primary care provider within 2-5 days if your signs and symptoms have not resolved or worsen.     If your condition worsens or fails to improve we recommend that you receive another evaluation at the emergency room immediately or contact your primary medical clinic to discuss your concerns.   You must understand that you have received an Urgent Care treatment only and that you may be released before all of your medical problems are known or treated. You, the patient, will arrange for follow up care as instructed.         Head Injury with Sleep Monitoring (Adult)    You have a head injury. It does not appear serious at this time. But symptoms of a more serious problem, such as mild brain injury (concussion), or bruising or bleeding in the brain, may appear later. For this reason, you and someone caring for you will need to watch for the symptoms listed below. Once at home, also be sure to follow any care instructions youre given.  Home care  Watch for the following symptoms  Someone must stay with you for the next 24 hours (or longer, if directed). If you fall asleep, this person should wake you up every 2 hours to check your symptoms. This is called sleep monitoring. Symptoms to watch for include:  · Headache  · Nausea or vomiting  · Dizziness  · Sensitivity to light or noise  · Unusual sleepiness or grogginess  · Trouble falling asleep  · Personality changes  · Vision changes  · Memory loss  · Confusion  · Trouble walking or clumsiness  · Loss of consciousness (even for a short time)  · Inability to be awakened  · Stiff neck  · Weakness or numbness in any part of the body  · Seizures  If you develop any of these symptoms, seek emergency medical medical care right away. If none of these symptoms are noted during the  first 24 hours, keep watching for symptoms for the next day or so. Ask your provider if someone should stay with you during this time.   General care  · If you were prescribed medicines for pain, use them as directed. Note: Dont use other pain medicines without checking with your provider first.  · To help reduce swelling and pain, apply a cold source to the injured area for up to 20 minutes at a time. Do this as often as directed. Use a cold pack or bag of ice wrapped in a thin towel. Never apply a cold source directly to the skin.  · If you have cuts or scrapes as a result of your injury, care for them as directed.  · For the next 24 hours (or longer, if instructed):  ¨ Dont drink alcohol or use sedatives or other medicines that make you sleepy.  ¨ Dont drive or operate machinery.  ¨ Dont do anything strenuous, such as heavy lifting or straining.  ¨ Limit tasks that require concentration. This includes reading, using a smartphone or computer, watching TV, and playing video games.  ¨ Dont return to sports or other activity that could result in another head injury.  Follow-up care  Follow up with your healthcare provider, or as directed. If imaging tests were done, they will be reviewed by a doctor. You will be told the results and any new findings that may affect your care.  When to seek medical advice  Call your healthcare provider right away if any of these occur:  · Pain doesnt get better or worsens  · New or increased swelling or bruising  · Fever of 100.4°F (38°C) or higher, or as directed by your provider  · Redness, warmth, bleeding, or drainage from the injured area  · Any depression or bony abnormality in the injured area  · Fluid drainage or bleeding from the nose or ears  Date Last Reviewed: 9/26/2015 © 2000-2017 [employer]. [address] [place] [address], [place] [address]. All rights reserved. This information is not intended as a substitute for professional medical care. Always follow  your healthcare professional's instructions.        Face Laceration: Skin Glue  A laceration is a cut through the skin. A laceration on your face has been closed with skin glue. This is used on cuts that have smooth edges and are not infected. In some cases, a lower layer of skin may be sutured before skin glue is put on. The skin glue closes the cut within a few minutes. It also provides a water-resistant cover. No bandage is needed. Skin glue peels off on its own within 5 to 10 days.     Home care  · Your healthcare provider may prescribe an antibiotic. This is to help prevent infection. Follow all instructions for taking this medicine. Take the medicine every day until it is gone or you are told to stop. You should not have any left over.  · The healthcare provider may prescribe medicines for pain. Follow instructions for taking them.  · Follow the healthcare providers instructions on how to care for the cut.  · Keep the wound clean and dry. You may shower or bathe as usual, but do not use soaps, lotions, or ointments on the wound area. Do not scrub the wound. After bathing, pat the wound dry with a soft towel. Avoid soaking the cut in water.  · Do not scratch, rub, or pick at the film. Do not place tape directly over the film.  · Do not apply liquids (such as peroxide), ointments, or creams to the wound while the film is in place.  · Most facial skin wounds heal without problems. However, an infection sometimes occurs despite proper treatment. Therefore, watch for the signs of infection listed below.  Follow-up care  Follow up with your health care provider as advised. Stitches should be removed from the face within 5 days. Stitches and staples should be removed from other parts of the body within 7-14 days. If dissolving stitches were used in the mouth, these will fall out or dissolve without the need for removal. If tape closures were used, remove them yourself if they have not fallen off after 7 days.  If skin glue was used, the film will fall off by itself in 5-10 days. Notify your healthcare provider if you notice persistent numbness or weakness in the injured hand.  When to seek medical advice  Call your health care provider right away if any of these occur:  · Wound bleeds more than a small amount or bleeding doesn't stop  · Signs of infection:  ¨ Increasing pain in the wound  ¨ Increasing wound redness or swelling  ¨ Pus or bad odor coming from the wound  ¨ Fever of 100.4°F (38.ºC) or as directed by your healthcare provider  · Wound edges re-open  Date Last Reviewed: 6/14/2015  © 4448-9555 [employer]. [address] [place] [address], [place] [address]. All rights reserved. This information is not intended as a substitute for professional medical care. Always follow your healthcare professional's instructions.

## 2019-07-30 NOTE — PROCEDURES
Laceration Repair  Date/Time: 7/30/2019 4:30 PM  Performed by: Harriett Shearer PA-C  Authorized by: Harriett Shearer PA-C   Body area: head/neck  Location details: forehead  Laceration length: 2 cm  Foreign bodies: no foreign bodies  Tendon involvement: none  Nerve involvement: none  Vascular damage: no  Amount of cleaning: standard  Skin closure: glue  Technique: simple  Approximation: close  Approximation difficulty: simple  Dressing: open (no dressing)  Patient tolerance: Patient tolerated the procedure well with no immediate complications

## 2019-07-31 ENCOUNTER — TELEPHONE (OUTPATIENT)
Dept: URGENT CARE | Facility: CLINIC | Age: 44
End: 2019-07-31

## 2019-07-31 NOTE — TELEPHONE ENCOUNTER
Called patient to follow up after visit on 07/30/2019. She states she is doing well - had trouble sleeping last night because of the sleep/wake monitoring.      All concerns were addressed and questions were answered.

## 2019-12-17 ENCOUNTER — PATIENT MESSAGE (OUTPATIENT)
Dept: OBSTETRICS AND GYNECOLOGY | Facility: CLINIC | Age: 44
End: 2019-12-17

## 2020-07-14 ENCOUNTER — OFFICE VISIT (OUTPATIENT)
Dept: OBSTETRICS AND GYNECOLOGY | Facility: CLINIC | Age: 45
End: 2020-07-14
Payer: MEDICAID

## 2020-07-14 VITALS
WEIGHT: 228.31 LBS | BODY MASS INDEX: 44.58 KG/M2 | DIASTOLIC BLOOD PRESSURE: 80 MMHG | SYSTOLIC BLOOD PRESSURE: 120 MMHG

## 2020-07-14 DIAGNOSIS — Z01.419 WELL WOMAN EXAM WITH ROUTINE GYNECOLOGICAL EXAM: Primary | ICD-10-CM

## 2020-07-14 DIAGNOSIS — Z12.39 BREAST CANCER SCREENING: ICD-10-CM

## 2020-07-14 DIAGNOSIS — N92.0 MENORRHAGIA WITH REGULAR CYCLE: ICD-10-CM

## 2020-07-14 DIAGNOSIS — Z11.3 SCREENING FOR STD (SEXUALLY TRANSMITTED DISEASE): ICD-10-CM

## 2020-07-14 PROCEDURE — 99396 PREV VISIT EST AGE 40-64: CPT | Mod: S$PBB,,, | Performed by: OBSTETRICS & GYNECOLOGY

## 2020-07-14 PROCEDURE — 99213 OFFICE O/P EST LOW 20 MIN: CPT | Mod: PBBFAC,PO | Performed by: OBSTETRICS & GYNECOLOGY

## 2020-07-14 PROCEDURE — 88175 CYTOPATH C/V AUTO FLUID REDO: CPT

## 2020-07-14 PROCEDURE — 87491 CHLMYD TRACH DNA AMP PROBE: CPT

## 2020-07-14 PROCEDURE — 99999 PR PBB SHADOW E&M-EST. PATIENT-LVL III: ICD-10-PCS | Mod: PBBFAC,,, | Performed by: OBSTETRICS & GYNECOLOGY

## 2020-07-14 PROCEDURE — 99396 PR PREVENTIVE VISIT,EST,40-64: ICD-10-PCS | Mod: S$PBB,,, | Performed by: OBSTETRICS & GYNECOLOGY

## 2020-07-14 PROCEDURE — 99999 PR PBB SHADOW E&M-EST. PATIENT-LVL III: CPT | Mod: PBBFAC,,, | Performed by: OBSTETRICS & GYNECOLOGY

## 2020-07-14 RX ORDER — HYDROXYZINE PAMOATE 25 MG/1
15 CAPSULE ORAL
COMMUNITY
Start: 2020-04-14

## 2020-07-14 RX ORDER — DULOXETIN HYDROCHLORIDE 60 MG/1
60 CAPSULE, DELAYED RELEASE ORAL DAILY
COMMUNITY
Start: 2020-06-11

## 2020-07-14 NOTE — PROGRESS NOTES
CC: Annual check-up    SUBJECTIVE:   44 y.o. female   for annual routine pap and checkup. Patient's last menstrual period was 2020. Pt complains of heavy bleeding. Her cycles are regular, occurring monthly, and lasting 5 days at a time. Pt reports using 10 super tampons on her heaviest days. She is interested in hysterectomy.      History reviewed. No pertinent past medical history.     Past Surgical History:   Procedure Laterality Date     SECTION      x 2    ENDOMETRIAL ABLATION N/A 2019    Procedure: ABLATION, ENDOMETRIUM;  Surgeon: Alfred Gann MD;  Location: [facility] OR;  Service: OB/GYN;  Laterality: N/A;    FOOT SURGERY      HYSTEROSCOPY WITH DILATION AND CURETTAGE OF UTERUS N/A 2019    Procedure: HYSTEROSCOPY, WITH DILATION AND CURETTAGE OF UTERUS;  Surgeon: Alfred Gann MD;  Location: [facility] OR;  Service: OB/GYN;  Laterality: N/A;    TONSILLECTOMY      TUBAL LIGATION      after      Social History     Socioeconomic History    Marital status:      Spouse name: Not on file    Number of children: Not on file    Years of education: Not on file    Highest education level: Not on file   Occupational History    Not on file   Social Needs    Financial resource strain: Not on file    Food insecurity     Worry: Not on file     Inability: Not on file    Transportation needs     Medical: Not on file     Non-medical: Not on file   Tobacco Use    Smoking status: Never Smoker    Smokeless tobacco: Never Used   Substance and Sexual Activity    Alcohol use: No    Drug use: Never    Sexual activity: Yes     Partners: Male     Birth control/protection: None   Lifestyle    Physical activity     Days per week: Not on file     Minutes per session: Not on file    Stress: Not on file   Relationships    Social connections     Talks on phone: Not on file     Gets together: Not on file     Attends Buddhist service: Not on file     Active member of club or  organization: Not on file     Attends meetings of clubs or organizations: Not on file     Relationship status: Not on file   Other Topics Concern    Not on file   Social History Narrative    Not on file     Family History   Problem Relation Age of Onset    Diabetes Mother     Bipolar disorder Mother     Diabetes Father      OB History    Para Term  AB Living   2 2 0 2   2   SAB TAB Ectopic Multiple Live Births           2      # Outcome Date GA Lbr Steven/2nd Weight Sex Delivery Anes PTL Lv   2  98 30w0d   F CS-Unspec   NIKI   1  92 30w0d   M CS-Unspec   NIKI     Current Outpatient Medications   Medication Sig Dispense Refill    DULoxetine (CYMBALTA) 60 MG capsule Take 60 mg by mouth once daily.      hydrOXYzine pamoate (VISTARIL) 25 MG Cap Take 15 mg by mouth as needed.       No current facility-administered medications for this visit.      Allergies: Cinnamon analogues, Keflex [cephalexin], Percocet [oxycodone-acetaminophen], Piroxicam, Prednisone, Pumpkin, and Oxycodone-aspirin     ROS:  Constitutional: no weight loss, weight gain, fever, fatigue  Eyes:  No vision changes, glasses/contacts  ENT/Mouth: No ulcers, sinus problems, ears ringing, headache  Cardiovascular: No inability to lie flat, chest pain, exercise intolerance, swelling, heart palpitations  Respiratory: No wheezing, coughing blood, shortness of breath, or cough  Gastrointestinal: No diarrhea, bloody stool, nausea/vomiting, constipation, gas, hemorrhoids  Genitourinary: No blood in urine, painful urination, urgency of urination, frequency of urination, incomplete emptying, incontinence, abnormal bleeding, painful periods, heavy periods, vaginal discharge, vaginal odor, painful intercourse, sexual problems, bleeding after intercourse.  Musculoskeletal: No muscle weakness  Skin/Breast: No painful breasts, nipple discharge, masses, rash, ulcers  Neurological: No passing out, seizures, numbness,  headache  Endocrine: No diabetes, hypothyroid, hyperthyroid, hot flashes, hair loss, abnormal hair growth, ance  Psychiatric: No depression, crying  Hematologic: No bruises, bleeding, swollen lymph nodes, anemia.      OBJECTIVE:   The patient appears well, alert, oriented x 3, in no distress.  /80   Wt 103.5 kg (228 lb 4.6 oz)   LMP 06/28/2020   BMI 44.58 kg/m²   NECK: no thyromegaly, trachea midline  SKIN: no acne, striae, hirsutism  BREAST EXAM: breasts appear normal, no suspicious masses, no skin or nipple changes or axillary nodes  ABDOMEN: no hernias, masses, or hepatosplenomegaly  GENITALIA: normal external genitalia, no erythema, no discharge  URETHRA: normal urethra, normal urethral meatus  VAGINA: normal  CERVIX: no lesions or cervical motion tenderness  UTERUS: normal size, contour, position, consistency, mobility, non-tender  ADNEXA: normal adnexa      ASSESSMENT:     1. Well woman exam with routine gynecological exam    2. Breast cancer screening    3. Screening for STD (sexually transmitted disease)    4. Menorrhagia with regular cycle        PLAN:   Mammogram  Pap smear  Will schedule ultrasound and then return appointment to clinic for EMBx    Orders Placed This Encounter    C. trachomatis/N. gonorrhoeae by AMP DNA [facility]; Cervicovaginal    Mammo Digital Screening Bilat w/ Florian    US Pelvis Comp with Transvag NON-OB (xpd    Liquid-Based Pap Smear, Screening

## 2020-07-17 LAB
C TRACH DNA SPEC QL NAA+PROBE: NOT DETECTED
N GONORRHOEA DNA SPEC QL NAA+PROBE: NOT DETECTED

## 2020-07-24 LAB
FINAL PATHOLOGIC DIAGNOSIS: NORMAL
Lab: NORMAL

## 2020-08-11 ENCOUNTER — TELEPHONE (OUTPATIENT)
Dept: OBSTETRICS AND GYNECOLOGY | Facility: CLINIC | Age: 45
End: 2020-08-11

## 2020-08-11 ENCOUNTER — HOSPITAL ENCOUNTER (OUTPATIENT)
Dept: RADIOLOGY | Facility: HOSPITAL | Age: 45
Discharge: HOME | End: 2020-08-11
Attending: OBSTETRICS & GYNECOLOGY
Payer: MEDICAID

## 2020-08-11 ENCOUNTER — OFFICE VISIT (OUTPATIENT)
Dept: OBSTETRICS AND GYNECOLOGY | Facility: CLINIC | Age: 45
End: 2020-08-11
Payer: MEDICAID

## 2020-08-11 VITALS
SYSTOLIC BLOOD PRESSURE: 120 MMHG | WEIGHT: 231.13 LBS | BODY MASS INDEX: 45.14 KG/M2 | DIASTOLIC BLOOD PRESSURE: 80 MMHG

## 2020-08-11 DIAGNOSIS — N92.0 MENORRHAGIA WITH REGULAR CYCLE: Primary | ICD-10-CM

## 2020-08-11 DIAGNOSIS — N92.0 MENORRHAGIA WITH REGULAR CYCLE: ICD-10-CM

## 2020-08-11 PROCEDURE — 76830 US PELVIS COMP WITH TRANSVAG NON-OB (XPD): ICD-10-PCS | Mod: 26,,, | Performed by: RADIOLOGY

## 2020-08-11 PROCEDURE — 99214 PR OFFICE/OUTPT VISIT, EST, LEVL IV, 30-39 MIN: ICD-10-PCS | Mod: 25,S$PBB,, | Performed by: OBSTETRICS & GYNECOLOGY

## 2020-08-11 PROCEDURE — 76830 TRANSVAGINAL US NON-OB: CPT | Mod: TC

## 2020-08-11 PROCEDURE — 58100 PR BIOPSY OF UTERUS LINING: ICD-10-PCS | Mod: S$PBB,,, | Performed by: OBSTETRICS & GYNECOLOGY

## 2020-08-11 PROCEDURE — 76856 US EXAM PELVIC COMPLETE: CPT | Mod: 26,,, | Performed by: RADIOLOGY

## 2020-08-11 PROCEDURE — 58100 BIOPSY OF UTERUS LINING: CPT | Mod: PBBFAC,PO | Performed by: OBSTETRICS & GYNECOLOGY

## 2020-08-11 PROCEDURE — 99999 PR PBB SHADOW E&M-EST. PATIENT-LVL II: CPT | Mod: PBBFAC,,, | Performed by: OBSTETRICS & GYNECOLOGY

## 2020-08-11 PROCEDURE — 99999 PR PBB SHADOW E&M-EST. PATIENT-LVL II: ICD-10-PCS | Mod: PBBFAC,,, | Performed by: OBSTETRICS & GYNECOLOGY

## 2020-08-11 PROCEDURE — 99212 OFFICE O/P EST SF 10 MIN: CPT | Mod: PBBFAC,25,PO | Performed by: OBSTETRICS & GYNECOLOGY

## 2020-08-11 PROCEDURE — 88305 TISSUE EXAM BY PATHOLOGIST: CPT | Mod: 26,,, | Performed by: PATHOLOGY

## 2020-08-11 PROCEDURE — 58100 BIOPSY OF UTERUS LINING: CPT | Mod: S$PBB,,, | Performed by: OBSTETRICS & GYNECOLOGY

## 2020-08-11 PROCEDURE — 76856 US PELVIS COMP WITH TRANSVAG NON-OB (XPD): ICD-10-PCS | Mod: 26,,, | Performed by: RADIOLOGY

## 2020-08-11 PROCEDURE — 76830 TRANSVAGINAL US NON-OB: CPT | Mod: 26,,, | Performed by: RADIOLOGY

## 2020-08-11 PROCEDURE — 88305 TISSUE EXAM BY PATHOLOGIST: ICD-10-PCS | Mod: 26,,, | Performed by: PATHOLOGY

## 2020-08-11 PROCEDURE — 88305 TISSUE EXAM BY PATHOLOGIST: CPT | Performed by: PATHOLOGY

## 2020-08-11 PROCEDURE — 99214 OFFICE O/P EST MOD 30 MIN: CPT | Mod: 25,S$PBB,, | Performed by: OBSTETRICS & GYNECOLOGY

## 2020-08-11 NOTE — PROGRESS NOTES
CC: SALLIE    Raquel Rhodes is a 44 y.o. female  presents with complaint of abnormal uterine bleeding.  She reportsclots,reports heavy bleeding,reports double protection, and denies accidents.      History reviewed. No pertinent past medical history.  Past Surgical History:   Procedure Laterality Date     SECTION      x 2    ENDOMETRIAL ABLATION N/A 2019    Procedure: ABLATION, ENDOMETRIUM;  Surgeon: Alfred Gann MD;  Location: [facility] OR;  Service: OB/GYN;  Laterality: N/A;    FOOT SURGERY      HYSTEROSCOPY WITH DILATION AND CURETTAGE OF UTERUS N/A 2019    Procedure: HYSTEROSCOPY, WITH DILATION AND CURETTAGE OF UTERUS;  Surgeon: Alfred Gann MD;  Location: [facility] OR;  Service: OB/GYN;  Laterality: N/A;    TONSILLECTOMY      TUBAL LIGATION      after      Social History     Socioeconomic History    Marital status:      Spouse name: Not on file    Number of children: Not on file    Years of education: Not on file    Highest education level: Not on file   Occupational History    Not on file   Social Needs    Financial resource strain: Not on file    Food insecurity     Worry: Not on file     Inability: Not on file    Transportation needs     Medical: Not on file     Non-medical: Not on file   Tobacco Use    Smoking status: Never Smoker    Smokeless tobacco: Never Used   Substance and Sexual Activity    Alcohol use: No    Drug use: Never    Sexual activity: Yes     Partners: Male     Birth control/protection: None   Lifestyle    Physical activity     Days per week: Not on file     Minutes per session: Not on file    Stress: Not on file   Relationships    Social connections     Talks on phone: Not on file     Gets together: Not on file     Attends Scientologist service: Not on file     Active member of club or organization: Not on file     Attends meetings of clubs or organizations: Not on file     Relationship status: Not on file   Other Topics Concern    Not  on file   Social History Narrative    Not on file     Family History   Problem Relation Age of Onset    Diabetes Mother     Bipolar disorder Mother     Diabetes Father      OB History        2    Para   2    Term   0       2    AB        Living   2       SAB        TAB        Ectopic        Multiple        Live Births   2                 /80   Wt 104.9 kg (231 lb 2.4 oz)   BMI 45.14 kg/m²     ROS:  Constitutional: no weight loss, weight gain, fever, fatigue  Eyes:  No vision changes, glasses/contacts  ENT/Mouth: No ulcers, sinus problems, ears ringing, headache  Cardiovascular: No inability to lie flat, chest pain, exercise intolerance, swelling, heart palpitations  Respiratory: No wheezing, coughing blood, shortness of breath, or cough  Gastrointestinal: No diarrhea, bloody stool, nausea/vomiting, constipation, gas, hemorrhoids  Genitourinary: No blood in urine, painful urination, urgency of urination, frequency of urination, incomplete emptying, incontinence,  painful periods, , vaginal discharge, vaginal odor, painful intercourse, sexual problems, bleeding after intercourse.  Musculoskeletal: No muscle weakness  Skin/Breast: No painful breasts, nipple discharge, masses, rash, ulcers  Neurological: No passing out, seizures, numbness, headache  Endocrine: No diabetes, hypothyroid, hyperthyroid, hot flashes, hair loss, abnormal hair growth, ance  Psychiatric: No depression, crying  Hematologic: No bruises, bleeding, swollen lymph nodes, anemia.      OBJECTIVE:   The patient appears well, alert, oriented x 3, in no distress.  /80   Wt 104.9 kg (231 lb 2.4 oz)   BMI 45.14 kg/m²   NECK: no thyromegaly, trachea midline  SKIN: no acne, striae, hirsutism  BREAST EXAM: not examined  ABDOMEN: no hernias, masses, or hepatosplenomegaly  GENITALIA: normal external genitalia, no erythema, no discharge  URETHRA: normal urethra, normal urethral meatus  VAGINA: Normal  CERVIX: no lesions or  cervical motion tenderness  UTERUS: irregular  ADNEXA: no mass, fullness, tenderness      ENDOMETRIAL BIOPSY:    The patient was informed of the risk of bleeding, infection, uterine perforation, and pain.  She was counseled that the test will rule-out endometrial cancer with an accuracy greater than 96%.  She was counseled on the alternatives to endometrial biopsy and agrees to proceed.  A time out was performed.    Anterior lip of the cervix was grasped with a single tooth tenaculum.  Pap was not done.  A sterile endometrial pipelle was inserted into the uterine cavity.  The uterus sounds to 7cm.  A small of tissue was obtained.  The patient tolerated the procedure well.    All tissue was sent to pathology.  EXAMINATION:  US PELVIS COMP WITH TRANSVAG NON-OB (XPD)     CLINICAL HISTORY:  Excessive and frequent menstruation with regular cycle     TECHNIQUE:  Transabdominal sonography of the pelvis was performed, followed by transvaginal sonography to better evaluate the uterus and ovaries.     COMPARISON:  Pelvic ultrasound dated 01/29/2019     FINDINGS:  Uterus:     Size: 7.6 x 6.0 x 6.3 cm     Masses: 3.0 x 1.8 x 2.6 fibroid near the fundus.  Heterogeneous myometrium.     Endometrium: Limited visualization of the endometrial stripe.     Right ovary:     Not well visualized.     Left ovary:     Partially seen.     Size: 2.3 x 1.2 x 1.7 cm     Appearance: No significant abnormality.     Vascular Flow: Present     Free Fluid:     None.     Impression:     Uterine fibroid with heterogeneous myometrium.  Limited evaluation of the endometrial stripe.     Nonvisualized right ovary.        Electronically signed by: Isidro Bateman  Date:                                            08/11/2020  Time:                                           14:00  Lab Results   Component Value Date    WBC 8.07 02/02/2019    HGB 12.7 02/02/2019    HCT 39.2 02/02/2019    MCV 80 (L) 02/02/2019     (H) 02/02/2019     ASSESSMENT AND  PLAN:    No diagnosis found. and plan    Discussed treatment options with patient including OCP's, Mirena IUD, UAE, endometrial ablation, and hysterectomy.  Patient desires TLH/BS  Consents done.

## 2020-08-11 NOTE — TELEPHONE ENCOUNTER
Please schedule pt for Robotic THL/BS next available at [facility]. Consents done for surgery today 8/11/20.       Thanks!

## 2020-08-14 LAB
FINAL PATHOLOGIC DIAGNOSIS: NORMAL
GROSS: NORMAL

## 2020-08-18 DIAGNOSIS — Z00.00 ROUTINE GENERAL MEDICAL EXAMINATION AT A HEALTH CARE FACILITY: Primary | ICD-10-CM

## 2020-09-24 ENCOUNTER — ANESTHESIA EVENT (OUTPATIENT)
Dept: SURGERY | Facility: HOSPITAL | Age: 45
End: 2020-09-24
Payer: MEDICAID

## 2020-09-29 ENCOUNTER — HOSPITAL ENCOUNTER (OUTPATIENT)
Dept: PREADMISSION TESTING | Facility: HOSPITAL | Age: 45
Discharge: HOME | End: 2020-09-29
Attending: OBSTETRICS & GYNECOLOGY
Payer: MEDICAID

## 2020-09-29 VITALS
WEIGHT: 231.25 LBS | RESPIRATION RATE: 16 BRPM | DIASTOLIC BLOOD PRESSURE: 93 MMHG | OXYGEN SATURATION: 95 % | BODY MASS INDEX: 43.66 KG/M2 | HEIGHT: 61 IN | HEART RATE: 107 BPM | SYSTOLIC BLOOD PRESSURE: 137 MMHG

## 2020-09-29 RX ORDER — SODIUM CHLORIDE, SODIUM LACTATE, POTASSIUM CHLORIDE, CALCIUM CHLORIDE 600; 310; 30; 20 MG/100ML; MG/100ML; MG/100ML; MG/100ML
INJECTION, SOLUTION INTRAVENOUS CONTINUOUS
Status: CANCELLED | OUTPATIENT
Start: 2020-09-29

## 2020-09-29 RX ORDER — LIDOCAINE HYDROCHLORIDE 10 MG/ML
1 INJECTION, SOLUTION EPIDURAL; INFILTRATION; INTRACAUDAL; PERINEURAL ONCE
Status: CANCELLED | OUTPATIENT
Start: 2020-09-29 | End: 2020-09-29

## 2020-09-29 NOTE — ANESTHESIA PREPROCEDURE EVALUATION
2020  Raquel Rhodes is a 44 y.o., female is scheduled for robotic hysterectomy and bilateral salpingectomy on 10/6/2020.    Hx of PONV - scopolamine ordered pre-op    History reviewed. No pertinent past medical history.     Past Surgical History:   Procedure Laterality Date     SECTION      x 2    ENDOMETRIAL ABLATION N/A 2019    Procedure: ABLATION, ENDOMETRIUM;  Surgeon: Alfred Gann MD;  Location: [facility] OR;  Service: OB/GYN;  Laterality: N/A;    FOOT SURGERY      HYSTEROSCOPY WITH DILATION AND CURETTAGE OF UTERUS N/A 2019    Procedure: HYSTEROSCOPY, WITH DILATION AND CURETTAGE OF UTERUS;  Surgeon: Alfred Gann MD;  Location: [facility] OR;  Service: OB/GYN;  Laterality: N/A;    TONSILLECTOMY      TUBAL LIGATION      after        Anesthesia Evaluation    I have reviewed the Patient Summary Reports.    I have reviewed the Nursing Notes.    I have reviewed the Medications.     Review of Systems  Anesthesia Hx:  Denies Family Hx of Anesthesia complications.    Social:  Non-Smoker, No Alcohol Use    Hematology/Oncology:  Hematology Normal        EENT/Dental:EENT/Dental Normal   Cardiovascular:   Exercise tolerance: good Denies Hypertension. Dysrhythmias: hx of PONV, scopolamine ordered for pre-op.   Denies Angina.        Pulmonary:   Denies Shortness of breath.    Renal/:  Renal/ Normal     Hepatic/GI:  Hepatic/GI Normal    Neurological:  Neurology Normal Denies TIA.  Denies CVA. Denies Seizures.    Endocrine:  Endocrine Normal        Physical Exam  General:  Morbid Obesity    Airway/Jaw/Neck:  Airway Findings: Mouth Opening: Normal Tongue: Normal  General Airway Assessment: Adult  Mallampati: II  TM Distance: Normal, at least 6 cm         Dental:  Dental Findings: In tact    Chest/Lungs:  Chest/Lungs Findings: Normal Respiratory Rate, Clear to auscultation      Heart/Vascular:  Heart Findings: Rate: Normal  Rhythm: Regular Rhythm  Sounds: Normal  Heart murmur: negative       Mental Status:  Mental Status Findings:  Cooperative, Alert and Oriented              Anesthesia Plan  Type of Anesthesia, risks & benefits discussed:  Anesthesia Type:  general  Patient's Preference:   Intra-op Monitoring Plan: standard ASA monitors  Intra-op Monitoring Plan Comments:   Post Op Pain Control Plan: multimodal analgesia  Post Op Pain Control Plan Comments:   Induction:   IV  Beta Blocker:  Patient is not currently on a Beta-Blocker (No further documentation required).       Informed Consent: Patient understands risks and agrees with Anesthesia plan.  Questions answered. Anesthesia consent signed with patient.  ASA Score: 2     Day of Surgery Review of History & Physical:        Anesthesia Plan Notes: Anesthesia consent to be signed prior to procedure on 10/6/2020          Ready For Surgery From Anesthesia Perspective.

## 2020-09-29 NOTE — DISCHARGE INSTRUCTIONS
Your surgery is scheduled for 10/6/20.    Please report to Front Lobby on the 1st Floor at 5:30a.m.    THIS TIME IS SUBJECT TO CHANGE.  YOU WILL RECEIVE A PHONE CALL THE DAY BEFORE SURGERY BY 3:30 PM TO CONFIRM YOUR TIME OF ARRIVAL.  IF YOU HAVE NOT RECEIVED A PHONE CALL BY 3:30 PM THE DAY BEFORE YOUR SURGERY PLEASE CALL [phone]     INSTRUCTIONS IMPORTANT!!!  ¨ Do not eat or drink after 12 midnight-including water. OK to brush teeth, no   gum, candy or mints!        ____  Do not wear makeup, including mascara.  ____  No powder, lotions or creams to surgical area.  ____  Please remove all jewelry, including piercings and leave at home.  ____  No money or valuables needed. Please leave at home.  ____  Please bring any documents given by your doctor.  ____  If going home the same day, arrange for a ride home. You will not be able to             drive if Anesthesia was used.  ____  Wear loose fitting clothing. Allow for dressings, bandages.  ____  Stop Aspirin, Ibuprofen, Motrin and Aleve at least 3-5 days before surgery, unless otherwise instructed by your doctor, or the nurse.   You MAY use Tylenol/acetaminophen until day of surgery.  ____  Wash the surgical area with Hibiclens the night before surgery, and again the             morning of surgery.  Be sure to rinse hibiclens off completely (if instructed by   nurse).  ____  If you take diabetic medication, do not take am of surgery unless instructed by Doctor.  ____  Call MD for temperature above 101 degrees or any other signs of infection such as Urinary (bladder) infection, Upper respiratory infection, skin boils, etc.  ____ Stop taking any Fish Oil supplement or any Vitamins that contain Vitamin E at least 5 days prior to surgery.  ____ Do Not wear your contact lenses the day of your procedure.  You may wear your glasses.      ____Do not shave surgical site for 3 days prior to surgery.  ____ Practice Good hand washing before, during, and after procedure.       I have read or had read and explained to me, and understand the above information.  Additional comments or instructions:  For additional questions call [phone]      ANESTHESIA SIDE EFFECTS  -For the first 24 hours after surgery:  Do not drive, use heavy equipment, make important decisions, or drink alcohol  -It is normal to feel sleepy for several hours.  Rest until you are more awake.  -Have someone stay with you, if needed.  They can watch for problems and help keep you safe.  -Some possible post anesthesia side effects include: nausea and vomiting, sore throat and hoarseness, sleepiness, and dizziness.        Pre-Op Bathing Instructions    Before surgery, you can play an important role in your own health.    Because skin is not sterile, we need to be sure that your skin is as free of germs as possible. By following the instructions below, you can reduce the number of germs on your skin before surgery.    IMPORTANT: You will need to shower with a special soap called Hibiclens*, available at any pharmacy.  If you are allergic to Chlorhexidine (the antiseptic in Hibiclens), use an antibacterial soap such as Dial Soap for your preoperative shower.  You will shower with Hibiclens both the night before your surgery and the morning of your surgery.  Do not use Hibiclens on the head, face or genitals to avoid injury to those areas.    STEP #1: THE NIGHT BEFORE YOUR SURGERY     1. Do not shave the area of your body where your surgery will be performed.  2. Shower and wash your hair and body as usual with your normal soap and shampoo.  3. Rinse your hair and body thoroughly after you shower to remove all soap residue.  4. With your hand, apply one packet of Hibiclens soap to the surgical site.   5. Wash the site gently for five (5) minutes. Do not scrub your skin too hard.   6. Do not wash with your regular soap after Hibiclens is used.  7. Rinse your body thoroughly.  8. Pat yourself dry with a clean, soft  towel.  9. Do not use lotion, cream, or powder.  10. Wear clean clothes.    STEP #2: THE MORNING OF YOUR SURGERY     1. Repeat Step #1.    * Not to be used by people allergic to Chlorhexidine.

## 2020-09-29 NOTE — PRE-PROCEDURE INSTRUCTIONS
[facility]  [place]  [phone]    Allergies, medical, surgical, family and psychosocial histories reviewed with patient. Periop plan of care reviewed. Preop instructions given, including medications to take and to hold. Hibiclens soap and instructions on use given. Time allotted for questions to be addressed.  Patient verbalized understanding.

## 2020-10-03 ENCOUNTER — LAB VISIT (OUTPATIENT)
Dept: URGENT CARE | Facility: CLINIC | Age: 45
End: 2020-10-03
Payer: MEDICAID

## 2020-10-03 DIAGNOSIS — Z00.00 ROUTINE GENERAL MEDICAL EXAMINATION AT A HEALTH CARE FACILITY: ICD-10-CM

## 2020-10-03 PROCEDURE — U0003 INFECTIOUS AGENT DETECTION BY NUCLEIC ACID (DNA OR RNA); SEVERE ACUTE RESPIRATORY SYNDROME CORONAVIRUS 2 (SARS-COV-2) (CORONAVIRUS DISEASE [COVID-19]), AMPLIFIED PROBE TECHNIQUE, MAKING USE OF HIGH THROUGHPUT TECHNOLOGIES AS DESCRIBED BY CMS-2020-01-R: HCPCS

## 2020-10-04 LAB — SARS-COV-2 RNA RESP QL NAA+PROBE: NOT DETECTED

## 2020-10-06 ENCOUNTER — ANESTHESIA (OUTPATIENT)
Dept: SURGERY | Facility: HOSPITAL | Age: 45
End: 2020-10-06
Payer: MEDICAID

## 2020-10-06 ENCOUNTER — HOSPITAL ENCOUNTER (OUTPATIENT)
Facility: HOSPITAL | Age: 45
Discharge: HOME | End: 2020-10-07
Attending: OBSTETRICS & GYNECOLOGY | Admitting: OBSTETRICS & GYNECOLOGY
Payer: MEDICAID

## 2020-10-06 DIAGNOSIS — D21.9 FIBROIDS: Primary | ICD-10-CM

## 2020-10-06 DIAGNOSIS — G47.33 OSA (OBSTRUCTIVE SLEEP APNEA): ICD-10-CM

## 2020-10-06 LAB
ABO + RH BLD: NORMAL
ANION GAP SERPL CALC-SCNC: 10 MMOL/L (ref 8–16)
B-HCG UR QL: NEGATIVE
BASOPHILS # BLD AUTO: 0.08 K/UL (ref 0–0.2)
BASOPHILS NFR BLD: 0.8 % (ref 0–1.9)
BLD GP AB SCN CELLS X3 SERPL QL: NORMAL
BUN SERPL-MCNC: 15 MG/DL (ref 6–20)
CALCIUM SERPL-MCNC: 9 MG/DL (ref 8.7–10.5)
CHLORIDE SERPL-SCNC: 102 MMOL/L (ref 95–110)
CO2 SERPL-SCNC: 26 MMOL/L (ref 23–29)
CREAT SERPL-MCNC: 0.7 MG/DL (ref 0.5–1.4)
CTP QC/QA: YES
DIFFERENTIAL METHOD: ABNORMAL
EOSINOPHIL # BLD AUTO: 0.2 K/UL (ref 0–0.5)
EOSINOPHIL NFR BLD: 2.2 % (ref 0–8)
ERYTHROCYTE [DISTWIDTH] IN BLOOD BY AUTOMATED COUNT: 14.8 % (ref 11.5–14.5)
EST. GFR  (AFRICAN AMERICAN): >60 ML/MIN/1.73 M^2
EST. GFR  (NON AFRICAN AMERICAN): >60 ML/MIN/1.73 M^2
GLUCOSE SERPL-MCNC: 103 MG/DL (ref 70–110)
HCT VFR BLD AUTO: 39.8 % (ref 37–48.5)
HGB BLD-MCNC: 12.5 G/DL (ref 12–16)
IMM GRANULOCYTES # BLD AUTO: 0.15 K/UL (ref 0–0.04)
IMM GRANULOCYTES NFR BLD AUTO: 1.5 % (ref 0–0.5)
LYMPHOCYTES # BLD AUTO: 2.5 K/UL (ref 1–4.8)
LYMPHOCYTES NFR BLD: 24.1 % (ref 18–48)
MCH RBC QN AUTO: 25.5 PG (ref 27–31)
MCHC RBC AUTO-ENTMCNC: 31.4 G/DL (ref 32–36)
MCV RBC AUTO: 81 FL (ref 82–98)
MONOCYTES # BLD AUTO: 0.6 K/UL (ref 0.3–1)
MONOCYTES NFR BLD: 6.1 % (ref 4–15)
NEUTROPHILS # BLD AUTO: 6.7 K/UL (ref 1.8–7.7)
NEUTROPHILS NFR BLD: 65.3 % (ref 38–73)
NRBC BLD-RTO: 0 /100 WBC
PLATELET # BLD AUTO: 394 K/UL (ref 150–350)
PMV BLD AUTO: 9.6 FL (ref 9.2–12.9)
POTASSIUM SERPL-SCNC: 3.7 MMOL/L (ref 3.5–5.1)
RBC # BLD AUTO: 4.91 M/UL (ref 4–5.4)
SODIUM SERPL-SCNC: 138 MMOL/L (ref 136–145)
WBC # BLD AUTO: 10.27 K/UL (ref 3.9–12.7)

## 2020-10-06 PROCEDURE — 63600175 PHARM REV CODE 636 W HCPCS: Performed by: OBSTETRICS & GYNECOLOGY

## 2020-10-06 PROCEDURE — 99900035 HC TECH TIME PER 15 MIN (STAT)

## 2020-10-06 PROCEDURE — 36000713 HC OR TIME LEV V EA ADD 15 MIN: Performed by: OBSTETRICS & GYNECOLOGY

## 2020-10-06 PROCEDURE — 86850 RBC ANTIBODY SCREEN: CPT

## 2020-10-06 PROCEDURE — 63600175 PHARM REV CODE 636 W HCPCS: Performed by: NURSE PRACTITIONER

## 2020-10-06 PROCEDURE — 25000003 PHARM REV CODE 250: Performed by: NURSE ANESTHETIST, CERTIFIED REGISTERED

## 2020-10-06 PROCEDURE — 00840 ANES IPER PX LOWER ABD NOS: CPT | Performed by: OBSTETRICS & GYNECOLOGY

## 2020-10-06 PROCEDURE — 25000003 PHARM REV CODE 250: Performed by: OBSTETRICS & GYNECOLOGY

## 2020-10-06 PROCEDURE — 36000712 HC OR TIME LEV V 1ST 15 MIN: Performed by: OBSTETRICS & GYNECOLOGY

## 2020-10-06 PROCEDURE — 71000039 HC RECOVERY, EACH ADD'L HOUR: Performed by: OBSTETRICS & GYNECOLOGY

## 2020-10-06 PROCEDURE — 94660 CPAP INITIATION&MGMT: CPT

## 2020-10-06 PROCEDURE — 88307 TISSUE EXAM BY PATHOLOGIST: CPT | Mod: 26,,, | Performed by: PATHOLOGY

## 2020-10-06 PROCEDURE — 88307 PR  SURG PATH,LEVEL V: ICD-10-PCS | Mod: 26,,, | Performed by: PATHOLOGY

## 2020-10-06 PROCEDURE — 27000221 HC OXYGEN, UP TO 24 HOURS

## 2020-10-06 PROCEDURE — 58571 TLH W/T/O 250 G OR LESS: CPT | Mod: 22,,, | Performed by: OBSTETRICS & GYNECOLOGY

## 2020-10-06 PROCEDURE — 37000008 HC ANESTHESIA 1ST 15 MINUTES: Performed by: OBSTETRICS & GYNECOLOGY

## 2020-10-06 PROCEDURE — 27201423 OPTIME MED/SURG SUP & DEVICES STERILE SUPPLY: Performed by: OBSTETRICS & GYNECOLOGY

## 2020-10-06 PROCEDURE — C2628 CATHETER, OCCLUSION: HCPCS | Performed by: OBSTETRICS & GYNECOLOGY

## 2020-10-06 PROCEDURE — 80048 BASIC METABOLIC PNL TOTAL CA: CPT

## 2020-10-06 PROCEDURE — S0020 INJECTION, BUPIVICAINE HYDRO: HCPCS | Performed by: OBSTETRICS & GYNECOLOGY

## 2020-10-06 PROCEDURE — 63600175 PHARM REV CODE 636 W HCPCS: Performed by: ANESTHESIOLOGY

## 2020-10-06 PROCEDURE — 63600175 PHARM REV CODE 636 W HCPCS: Performed by: NURSE ANESTHETIST, CERTIFIED REGISTERED

## 2020-10-06 PROCEDURE — 25000003 PHARM REV CODE 250: Performed by: ANESTHESIOLOGY

## 2020-10-06 PROCEDURE — 58571 PR LAPAROSCOPY W TOT HYSTERECTUTERUS <=250 GRAM  W TUBE/OVARY: ICD-10-PCS | Mod: 22,,, | Performed by: OBSTETRICS & GYNECOLOGY

## 2020-10-06 PROCEDURE — 88307 TISSUE EXAM BY PATHOLOGIST: CPT | Performed by: PATHOLOGY

## 2020-10-06 PROCEDURE — 27000190 HC CPAP FULL FACE MASK W/VALVE

## 2020-10-06 PROCEDURE — 37000009 HC ANESTHESIA EA ADD 15 MINS: Performed by: OBSTETRICS & GYNECOLOGY

## 2020-10-06 PROCEDURE — 85025 COMPLETE CBC W/AUTO DIFF WBC: CPT

## 2020-10-06 PROCEDURE — 81025 URINE PREGNANCY TEST: CPT | Performed by: OBSTETRICS & GYNECOLOGY

## 2020-10-06 PROCEDURE — 94761 N-INVAS EAR/PLS OXIMETRY MLT: CPT

## 2020-10-06 PROCEDURE — 36415 COLL VENOUS BLD VENIPUNCTURE: CPT

## 2020-10-06 PROCEDURE — 71000033 HC RECOVERY, INTIAL HOUR: Performed by: OBSTETRICS & GYNECOLOGY

## 2020-10-06 PROCEDURE — 25000003 PHARM REV CODE 250: Performed by: STUDENT IN AN ORGANIZED HEALTH CARE EDUCATION/TRAINING PROGRAM

## 2020-10-06 RX ORDER — SODIUM CHLORIDE 0.9 % (FLUSH) 0.9 %
10 SYRINGE (ML) INJECTION
Status: DISCONTINUED | OUTPATIENT
Start: 2020-10-06 | End: 2020-10-07 | Stop reason: HOSPADM

## 2020-10-06 RX ORDER — ESMOLOL HYDROCHLORIDE 10 MG/ML
INJECTION INTRAVENOUS
Status: DISCONTINUED | OUTPATIENT
Start: 2020-10-06 | End: 2020-10-06

## 2020-10-06 RX ORDER — LIDOCAINE HCL/PF 100 MG/5ML
SYRINGE (ML) INTRAVENOUS
Status: DISCONTINUED | OUTPATIENT
Start: 2020-10-06 | End: 2020-10-06

## 2020-10-06 RX ORDER — SODIUM CHLORIDE, SODIUM LACTATE, POTASSIUM CHLORIDE, CALCIUM CHLORIDE 600; 310; 30; 20 MG/100ML; MG/100ML; MG/100ML; MG/100ML
INJECTION, SOLUTION INTRAVENOUS CONTINUOUS
Status: DISCONTINUED | OUTPATIENT
Start: 2020-10-06 | End: 2020-10-06

## 2020-10-06 RX ORDER — MIDAZOLAM HYDROCHLORIDE 1 MG/ML
INJECTION, SOLUTION INTRAMUSCULAR; INTRAVENOUS
Status: DISCONTINUED | OUTPATIENT
Start: 2020-10-06 | End: 2020-10-06

## 2020-10-06 RX ORDER — FENTANYL CITRATE 50 UG/ML
INJECTION, SOLUTION INTRAMUSCULAR; INTRAVENOUS
Status: DISCONTINUED | OUTPATIENT
Start: 2020-10-06 | End: 2020-10-06

## 2020-10-06 RX ORDER — DIPHENHYDRAMINE HYDROCHLORIDE 50 MG/ML
25 INJECTION INTRAMUSCULAR; INTRAVENOUS EVERY 4 HOURS PRN
Status: DISCONTINUED | OUTPATIENT
Start: 2020-10-06 | End: 2020-10-07 | Stop reason: HOSPADM

## 2020-10-06 RX ORDER — DIPHENHYDRAMINE HCL 25 MG
25 CAPSULE ORAL EVERY 4 HOURS PRN
Status: DISCONTINUED | OUTPATIENT
Start: 2020-10-06 | End: 2020-10-07 | Stop reason: HOSPADM

## 2020-10-06 RX ORDER — METOPROLOL TARTRATE 1 MG/ML
INJECTION, SOLUTION INTRAVENOUS
Status: DISCONTINUED | OUTPATIENT
Start: 2020-10-06 | End: 2020-10-06

## 2020-10-06 RX ORDER — SCOLOPAMINE TRANSDERMAL SYSTEM 1 MG/1
1 PATCH, EXTENDED RELEASE TRANSDERMAL
Status: DISCONTINUED | OUTPATIENT
Start: 2020-10-06 | End: 2020-10-06 | Stop reason: HOSPADM

## 2020-10-06 RX ORDER — ONDANSETRON 8 MG/1
8 TABLET, ORALLY DISINTEGRATING ORAL EVERY 8 HOURS PRN
Status: DISCONTINUED | OUTPATIENT
Start: 2020-10-06 | End: 2020-10-07 | Stop reason: HOSPADM

## 2020-10-06 RX ORDER — DULOXETIN HYDROCHLORIDE 30 MG/1
60 CAPSULE, DELAYED RELEASE ORAL DAILY
Status: DISCONTINUED | OUTPATIENT
Start: 2020-10-06 | End: 2020-10-07 | Stop reason: HOSPADM

## 2020-10-06 RX ORDER — ROCURONIUM BROMIDE 10 MG/ML
INJECTION, SOLUTION INTRAVENOUS
Status: DISCONTINUED | OUTPATIENT
Start: 2020-10-06 | End: 2020-10-06

## 2020-10-06 RX ORDER — MUPIROCIN 20 MG/G
OINTMENT TOPICAL
Status: DISCONTINUED | OUTPATIENT
Start: 2020-10-06 | End: 2020-10-06

## 2020-10-06 RX ORDER — PROPOFOL 10 MG/ML
VIAL (ML) INTRAVENOUS
Status: DISCONTINUED | OUTPATIENT
Start: 2020-10-06 | End: 2020-10-06

## 2020-10-06 RX ORDER — TAMSULOSIN HYDROCHLORIDE 0.4 MG/1
0.4 CAPSULE ORAL DAILY
Status: DISCONTINUED | OUTPATIENT
Start: 2020-10-06 | End: 2020-10-07 | Stop reason: HOSPADM

## 2020-10-06 RX ORDER — ONDANSETRON 2 MG/ML
4 INJECTION INTRAMUSCULAR; INTRAVENOUS ONCE AS NEEDED
Status: DISCONTINUED | OUTPATIENT
Start: 2020-10-06 | End: 2020-10-06 | Stop reason: HOSPADM

## 2020-10-06 RX ORDER — HYDROXYZINE PAMOATE 25 MG/1
25 CAPSULE ORAL EVERY 8 HOURS PRN
Status: DISCONTINUED | OUTPATIENT
Start: 2020-10-06 | End: 2020-10-07 | Stop reason: HOSPADM

## 2020-10-06 RX ORDER — SODIUM CHLORIDE 0.9 % (FLUSH) 0.9 %
10 SYRINGE (ML) INJECTION
Status: DISCONTINUED | OUTPATIENT
Start: 2020-10-06 | End: 2020-10-06

## 2020-10-06 RX ORDER — SODIUM CHLORIDE 9 MG/ML
INJECTION, SOLUTION INTRAVENOUS CONTINUOUS
Status: DISCONTINUED | OUTPATIENT
Start: 2020-10-06 | End: 2020-10-06

## 2020-10-06 RX ORDER — HYDROCODONE BITARTRATE AND ACETAMINOPHEN 7.5; 325 MG/1; MG/1
1 TABLET ORAL EVERY 4 HOURS PRN
Status: DISCONTINUED | OUTPATIENT
Start: 2020-10-06 | End: 2020-10-07 | Stop reason: HOSPADM

## 2020-10-06 RX ORDER — SODIUM CHLORIDE 9 MG/ML
INJECTION, SOLUTION INTRAVENOUS CONTINUOUS PRN
Status: DISCONTINUED | OUTPATIENT
Start: 2020-10-06 | End: 2020-10-06

## 2020-10-06 RX ORDER — DEXAMETHASONE SODIUM PHOSPHATE 4 MG/ML
INJECTION, SOLUTION INTRA-ARTICULAR; INTRALESIONAL; INTRAMUSCULAR; INTRAVENOUS; SOFT TISSUE
Status: DISCONTINUED | OUTPATIENT
Start: 2020-10-06 | End: 2020-10-06

## 2020-10-06 RX ORDER — NALOXONE HCL 0.4 MG/ML
VIAL (ML) INJECTION
Status: DISCONTINUED | OUTPATIENT
Start: 2020-10-06 | End: 2020-10-06

## 2020-10-06 RX ORDER — ONDANSETRON 8 MG/1
8 TABLET, ORALLY DISINTEGRATING ORAL EVERY 8 HOURS PRN
Status: DISCONTINUED | OUTPATIENT
Start: 2020-10-06 | End: 2020-10-06

## 2020-10-06 RX ORDER — BUPIVACAINE HYDROCHLORIDE 2.5 MG/ML
INJECTION, SOLUTION INFILTRATION; PERINEURAL
Status: DISCONTINUED | OUTPATIENT
Start: 2020-10-06 | End: 2020-10-06 | Stop reason: HOSPADM

## 2020-10-06 RX ORDER — LIDOCAINE HYDROCHLORIDE 10 MG/ML
1 INJECTION, SOLUTION EPIDURAL; INFILTRATION; INTRACAUDAL; PERINEURAL ONCE
Status: DISCONTINUED | OUTPATIENT
Start: 2020-10-06 | End: 2020-10-06

## 2020-10-06 RX ORDER — HYDRALAZINE HYDROCHLORIDE 20 MG/ML
INJECTION INTRAMUSCULAR; INTRAVENOUS
Status: DISCONTINUED | OUTPATIENT
Start: 2020-10-06 | End: 2020-10-06

## 2020-10-06 RX ORDER — ACETAMINOPHEN 10 MG/ML
INJECTION, SOLUTION INTRAVENOUS
Status: DISCONTINUED | OUTPATIENT
Start: 2020-10-06 | End: 2020-10-06

## 2020-10-06 RX ORDER — HYDROMORPHONE HYDROCHLORIDE 2 MG/ML
0.5 INJECTION, SOLUTION INTRAMUSCULAR; INTRAVENOUS; SUBCUTANEOUS EVERY 5 MIN PRN
Status: DISCONTINUED | OUTPATIENT
Start: 2020-10-06 | End: 2020-10-06 | Stop reason: HOSPADM

## 2020-10-06 RX ORDER — CLINDAMYCIN PHOSPHATE 900 MG/50ML
900 INJECTION, SOLUTION INTRAVENOUS
Status: COMPLETED | OUTPATIENT
Start: 2020-10-06 | End: 2020-10-06

## 2020-10-06 RX ORDER — IBUPROFEN 600 MG/1
600 TABLET ORAL EVERY 6 HOURS PRN
Status: DISCONTINUED | OUTPATIENT
Start: 2020-10-06 | End: 2020-10-07 | Stop reason: HOSPADM

## 2020-10-06 RX ORDER — NEOSTIGMINE METHYLSULFATE 1 MG/ML
INJECTION, SOLUTION INTRAVENOUS
Status: DISCONTINUED | OUTPATIENT
Start: 2020-10-06 | End: 2020-10-06

## 2020-10-06 RX ORDER — ONDANSETRON 2 MG/ML
INJECTION INTRAMUSCULAR; INTRAVENOUS
Status: DISCONTINUED | OUTPATIENT
Start: 2020-10-06 | End: 2020-10-06

## 2020-10-06 RX ADMIN — DEXAMETHASONE SODIUM PHOSPHATE 8 MG: 4 INJECTION, SOLUTION INTRA-ARTICULAR; INTRALESIONAL; INTRAMUSCULAR; INTRAVENOUS; SOFT TISSUE at 08:10

## 2020-10-06 RX ADMIN — SODIUM CHLORIDE: 0.9 INJECTION, SOLUTION INTRAVENOUS at 08:10

## 2020-10-06 RX ADMIN — GLYCOPYRROLATE 0.6 MG: 0.2 INJECTION, SOLUTION INTRAMUSCULAR; INTRAVITREAL at 12:10

## 2020-10-06 RX ADMIN — SCOPALAMINE 1 PATCH: 1 PATCH, EXTENDED RELEASE TRANSDERMAL at 06:10

## 2020-10-06 RX ADMIN — MIDAZOLAM 2 MG: 1 INJECTION INTRAMUSCULAR; INTRAVENOUS at 07:10

## 2020-10-06 RX ADMIN — NALOXONE HYDROCHLORIDE 0.4 MG: 0.4 INJECTION, SOLUTION INTRAMUSCULAR; INTRAVENOUS; SUBCUTANEOUS at 12:10

## 2020-10-06 RX ADMIN — HYDROMORPHONE HYDROCHLORIDE 0.5 MG: 2 INJECTION INTRAMUSCULAR; INTRAVENOUS; SUBCUTANEOUS at 01:10

## 2020-10-06 RX ADMIN — ROCURONIUM BROMIDE 20 MG: 10 INJECTION, SOLUTION INTRAVENOUS at 08:10

## 2020-10-06 RX ADMIN — FENTANYL CITRATE 50 MCG: 50 INJECTION, SOLUTION INTRAMUSCULAR; INTRAVENOUS at 09:10

## 2020-10-06 RX ADMIN — PROPOFOL 130 MG: 10 INJECTION, EMULSION INTRAVENOUS at 08:10

## 2020-10-06 RX ADMIN — NEOSTIGMINE METHYLSULFATE 4 MG: 1 INJECTION INTRAVENOUS at 12:10

## 2020-10-06 RX ADMIN — ESMOLOL HYDROCHLORIDE 10 MG: 10 INJECTION, SOLUTION INTRAVENOUS at 11:10

## 2020-10-06 RX ADMIN — ACETAMINOPHEN 1000 MG: 10 INJECTION, SOLUTION INTRAVENOUS at 08:10

## 2020-10-06 RX ADMIN — SODIUM CHLORIDE: 0.9 INJECTION, SOLUTION INTRAVENOUS at 09:10

## 2020-10-06 RX ADMIN — PROPOFOL 70 MG: 10 INJECTION, EMULSION INTRAVENOUS at 08:10

## 2020-10-06 RX ADMIN — DULOXETINE HYDROCHLORIDE 60 MG: 30 CAPSULE, DELAYED RELEASE ORAL at 10:10

## 2020-10-06 RX ADMIN — HYDRALAZINE HYDROCHLORIDE 5 MG: 20 INJECTION INTRAMUSCULAR; INTRAVENOUS at 12:10

## 2020-10-06 RX ADMIN — ESMOLOL HYDROCHLORIDE 10 MG: 10 INJECTION, SOLUTION INTRAVENOUS at 10:10

## 2020-10-06 RX ADMIN — SODIUM CHLORIDE: 0.9 INJECTION, SOLUTION INTRAVENOUS at 11:10

## 2020-10-06 RX ADMIN — ROCURONIUM BROMIDE 20 MG: 10 INJECTION, SOLUTION INTRAVENOUS at 11:10

## 2020-10-06 RX ADMIN — FENTANYL CITRATE 50 MCG: 50 INJECTION, SOLUTION INTRAMUSCULAR; INTRAVENOUS at 08:10

## 2020-10-06 RX ADMIN — GENTAMICIN SULFATE 336 MG: 40 INJECTION, SOLUTION INTRAMUSCULAR; INTRAVENOUS at 08:10

## 2020-10-06 RX ADMIN — SODIUM CHLORIDE, SODIUM LACTATE, POTASSIUM CHLORIDE, AND CALCIUM CHLORIDE: .6; .31; .03; .02 INJECTION, SOLUTION INTRAVENOUS at 06:10

## 2020-10-06 RX ADMIN — ROCURONIUM BROMIDE 30 MG: 10 INJECTION, SOLUTION INTRAVENOUS at 10:10

## 2020-10-06 RX ADMIN — ESMOLOL HYDROCHLORIDE 10 MG: 10 INJECTION, SOLUTION INTRAVENOUS at 09:10

## 2020-10-06 RX ADMIN — FENTANYL CITRATE 50 MCG: 50 INJECTION, SOLUTION INTRAMUSCULAR; INTRAVENOUS at 11:10

## 2020-10-06 RX ADMIN — ROCURONIUM BROMIDE 50 MG: 10 INJECTION, SOLUTION INTRAVENOUS at 08:10

## 2020-10-06 RX ADMIN — CLINDAMYCIN PHOSPHATE 900 MG: 18 INJECTION, SOLUTION INTRAVENOUS at 08:10

## 2020-10-06 RX ADMIN — LIDOCAINE HYDROCHLORIDE 100 MG: 20 INJECTION, SOLUTION INTRAVENOUS at 08:10

## 2020-10-06 RX ADMIN — ONDANSETRON 4 MG: 2 INJECTION, SOLUTION INTRAMUSCULAR; INTRAVENOUS at 08:10

## 2020-10-06 RX ADMIN — ROCURONIUM BROMIDE 10 MG: 10 INJECTION, SOLUTION INTRAVENOUS at 10:10

## 2020-10-06 RX ADMIN — FENTANYL CITRATE 50 MCG: 50 INJECTION, SOLUTION INTRAMUSCULAR; INTRAVENOUS at 07:10

## 2020-10-06 RX ADMIN — HYDROCODONE BITARTRATE AND ACETAMINOPHEN 1 TABLET: 7.5; 325 TABLET ORAL at 09:10

## 2020-10-06 RX ADMIN — METOPROLOL TARTRATE 5 MG: 1 INJECTION, SOLUTION INTRAVENOUS at 12:10

## 2020-10-06 RX ADMIN — FENTANYL CITRATE 50 MCG: 50 INJECTION, SOLUTION INTRAMUSCULAR; INTRAVENOUS at 12:10

## 2020-10-06 RX ADMIN — NEOSTIGMINE METHYLSULFATE 5 MG: 1 INJECTION INTRAVENOUS at 12:10

## 2020-10-06 RX ADMIN — HYDROCODONE BITARTRATE AND ACETAMINOPHEN 1 TABLET: 7.5; 325 TABLET ORAL at 04:10

## 2020-10-06 NOTE — PROGRESS NOTES
Pt assesed on RR, doing well, no SOB, shoulder discomfort and bladder spasms  Discussed will monitor respiratory status overnight and will need to f/u with sleep medicine outpt  Appreciate Pulm recs

## 2020-10-06 NOTE — CONSULTS
[facility] Pul[facility] Medicine Consultation Note     Raquel Rhodes   Age: 44 yrs   MRN: [id number] Admit date: 10/6/2020  LOS: 0       Primary Attending:  Donis Vela MD   Primary Team: Obstetrics Gynecology   Consultant Attending: Roland Kwon MD   Consultant Fellow: Sajan Scanlon DO     Consult Information / HPI / History:     HPI:     Ms. Rhodes is a 44 year old female with no significant past medical history presenting today for abnormal uterine bleeding. Presented today for scheduled TLH/BS. She tolerated the procedure well. Post procedure, she had some shortness of breath that was alleviated with bipap. At this time she was slightly somnolent. She currently denies past or present history of lung problems, asthma, smoking. She denies recent illness, cough, or previous shortness of breath.     Review of Systems     14 point ROS performed and was negative, with pertinent positives/negatives as listed above in HPI.      Past Medical History     History reviewed. No pertinent past medical history.          Past Surgical History     Past Surgical History:   Procedure Laterality Date     SECTION      x 2    ENDOMETRIAL ABLATION N/A 2019    Procedure: ABLATION, ENDOMETRIUM;  Surgeon: Alfred Gann MD;  Location: [facility] OR;  Service: OB/GYN;  Laterality: N/A;    FOOT SURGERY      HYSTEROSCOPY WITH DILATION AND CURETTAGE OF UTERUS N/A 2019    Procedure: HYSTEROSCOPY, WITH DILATION AND CURETTAGE OF UTERUS;  Surgeon: Alfred Gann MD;  Location: [facility] OR;  Service: OB/GYN;  Laterality: N/A;    TONSILLECTOMY      TUBAL LIGATION      after              Family History     Family History   Problem Relation Age of Onset    Diabetes Mother     Bipolar disorder Mother     Diabetes Father              Allergies     Review of patient's allergies indicates:   Allergen Reactions    Cinnamon analogues Anaphylaxis    Keflex [cephalexin] Anaphylaxis    Percocet  [oxycodone-acetaminophen] Hives    Piroxicam Other (See Comments)     Severe nose bleeds    Prednisone Shortness Of Breath    Pumpkin Anaphylaxis    Oxycodone-aspirin      Other reaction(s): RASH/SOB             Medications      No current facility-administered medications on file prior to encounter.      Current Outpatient Medications on File Prior to Encounter   Medication Sig Dispense Refill    DULoxetine (CYMBALTA) 60 MG capsule Take 60 mg by mouth once daily.      hydrOXYzine pamoate (VISTARIL) 25 MG Cap Take 15 mg by mouth as needed.                 Social history     Non-smoker         VITALS:     Temp:  [98.1 °F (36.7 °C)-98.8 °F (37.1 °C)] 98.1 °F (36.7 °C)  Pulse:  [] 96  Resp:  [18-41] 22  SpO2:  [88 %-98 %] 95 %  BP: (123-162)/(50-76) 162/72            Intake/Output last 3 shifts:     No intake/output data recorded.     Intake/Output this shift:     I/O this shift:  In: 2800 [I.V.:2800]  Out: -         PHYSICAL EXAM:   Constitutional: Patient appeared groomed, NAD     HEENT:Normocephalic, atraumatic, PERRL    Neck:Supple, no masses, trachea not deviated, no crepitus, no JVD. . Short, thick neck circumference.     Resp: Symmetrical, normal expansion, no use of accessory muscles, clear to auscultation bilaterally, No wheezes, rhonchi, or crackles.       Cardio: Regular rate and rhythm, S1 and S2 normal, no murmur, rub or gallop. No Edema, peripheral pulses 2+ and symmetric, extremities warm     Skin: No rashes or lesions      Neurologic: Alert, oriented x3 - nonfocal examination         MEDICATIONS:   Scheduled Meds:   lidocaine (PF) 10 mg/ml (1%)  1 mL Intradermal Once    scopolamine  1 patch Transdermal Q3 Days     Continuous Infusions:   sodium chloride 0.9%      lactated ringers 10 mL/hr at 10/06/20 0651     PRN Meds:diphenhydrAMINE, diphenhydrAMINE, HYDROcodone-acetaminophen, HYDROmorphone, ibuprofen, mupirocin, ondansetron, ondansetron, ondansetron, promethazine (PHENERGAN) IVPB,  promethazine (PHENERGAN) IVPB, sodium chloride 0.9%, sodium chloride 0.9%     LABORATORY RESULTS:   CBC:     Recent Labs   Lab 10/06/20  0553   WBC 10.27   HGB 12.5   HCT 39.8   *        CMP:     Recent Labs     10/06/20  0553         CO2 26   BUN 15   CREATININE 0.7      CALCIUM 9.0        COAG:     No results for input(s): PROTIME, PTT, INR in the last 72 hours.            No results found for this or any previous visit.          No results found for this or any previous visit.          No results found for this or any previous visit.          No results found for this or any previous visit.        RADIOLOGY:    No results found in the last 24 hours.     OTHER STUDIES:   Echo: No results found in the last 24 hours.     MICROBIOLOGY:   Microbiology Results (last 7 days)     ** No results found for the last 168 hours. **           ASSESSMENT / RECOMMENDATIONS:       1. Post-procedure respiratory distress - resolved  2. Suspected sleep apnea    -- BMI of 43 with neck circumference ~18 cm with positive Gorham and Stopbang scale on history - high suspicion for obstructive sleep apnea.  -- The fact that she is currently asymptomatic, more awake following anesthesia, and with a negative physical examination reinforces this was likely intermittent obstruction following extubation. No audible stridor to think this was laryngeal edema.  -- If inpatient tonight, recommend use of CPAP 8 cm H2O with full face mask.  -- Outpatient consult to Sleep Medicine at discharge.      Case, assessment, and recommendations discussed with attending; changes in care pending clinical course.           Thank you for allowing us to participate in the care of this patient. We will sign off. Call with questions.             Sajan Scanlon DO  [facility] Pul[facility] Care Fellow  Pager: [phone]

## 2020-10-06 NOTE — ANESTHESIA PROCEDURE NOTES
Intubation  Performed by: Rachael Murcia CRNA  Authorized by: Corine Saunders MD     Intubation:     Induction:  Intravenous    Intubated:  Postinduction    Mask Ventilation:  Moderately difficult with oral airway    Attempts:  2    Attempted By:  Student    Method of Intubation:  Direct    Blade:  Vlad 3    Laryngeal View Grade: Grade III - only epiglottis visible      Attempted By (2nd Attempt):  CRNA    Method of Intubation (2nd Attempt):  Direct    Blade (2nd Attempt):  Harris 2    Laryngeal View Grade (2nd Attempt): Grade I - full view of cords      Difficult Airway Encountered?: No      Complications:  Soft tissue trauma    Airway Device:  Oral endotracheal tube    Airway Device Size:  7.0    Style/Cuff Inflation:  Cuffed    Inflation Amount (mL):  8    Tube secured:  22    Secured at:  The lips    Placement Verified By:  Capnometry    Complicating Factors:  Obesity, small mouth and short neck    Findings Post-Intubation:  BS equal bilateral and atraumatic/condition of teeth unchanged

## 2020-10-06 NOTE — H&P
CC: AUB,fibroid     Raquel Rhodes is a 44 y.o. female  presents with complaint of abnormal uterine bleeding.  She reportsclots,reports heavy bleeding,reports double protection, and denies accidents.       History reviewed. No pertinent past medical history.        Past Surgical History:   Procedure Laterality Date     SECTION         x 2    ENDOMETRIAL ABLATION N/A 2019     Procedure: ABLATION, ENDOMETRIUM;  Surgeon: Alfred Gann MD;  Location: [facility] OR;  Service: OB/GYN;  Laterality: N/A;    FOOT SURGERY        HYSTEROSCOPY WITH DILATION AND CURETTAGE OF UTERUS N/A 2019     Procedure: HYSTEROSCOPY, WITH DILATION AND CURETTAGE OF UTERUS;  Surgeon: Alfred Gann MD;  Location: [facility] OR;  Service: OB/GYN;  Laterality: N/A;    TONSILLECTOMY        TUBAL LIGATION         after       Social History               Socioeconomic History    Marital status:        Spouse name: Not on file    Number of children: Not on file    Years of education: Not on file    Highest education level: Not on file   Occupational History    Not on file   Social Needs    Financial resource strain: Not on file    Food insecurity       Worry: Not on file       Inability: Not on file    Transportation needs       Medical: Not on file       Non-medical: Not on file   Tobacco Use    Smoking status: Never Smoker    Smokeless tobacco: Never Used   Substance and Sexual Activity    Alcohol use: No    Drug use: Never    Sexual activity: Yes       Partners: Male       Birth control/protection: None   Lifestyle    Physical activity       Days per week: Not on file       Minutes per session: Not on file    Stress: Not on file   Relationships    Social connections       Talks on phone: Not on file       Gets together: Not on file       Attends [employer] service: Not on file       Active member of club or organization: Not on file       Attends meetings of clubs or organizations: Not on file        Relationship status: Not on file   Other Topics Concern    Not on file   Social History Narrative    Not on file              Family History   Problem Relation Age of Onset    Diabetes Mother      Bipolar disorder Mother      Diabetes Father        OB History         2    Para   2    Term   0       2    AB        Living   2        SAB        TAB        Ectopic        Multiple        Live Births   2                     /80   Wt 104.9 kg (231 lb 2.4 oz)   BMI 45.14 kg/m²      ROS:  Constitutional: no weight loss, weight gain, fever, fatigue  Eyes:  No vision changes, glasses/contacts  ENT/Mouth: No ulcers, sinus problems, ears ringing, headache  Cardiovascular: No inability to lie flat, chest pain, exercise intolerance, swelling, heart palpitations  Respiratory: No wheezing, coughing blood, shortness of breath, or cough  Gastrointestinal: No diarrhea, bloody stool, nausea/vomiting, constipation, gas, hemorrhoids  Genitourinary: No blood in urine, painful urination, urgency of urination, frequency of urination, incomplete emptying, incontinence,  painful periods, , vaginal discharge, vaginal odor, painful intercourse, sexual problems, bleeding after intercourse.  Musculoskeletal: No muscle weakness  Skin/Breast: No painful breasts, nipple discharge, masses, rash, ulcers  Neurological: No passing out, seizures, numbness, headache  Endocrine: No diabetes, hypothyroid, hyperthyroid, hot flashes, hair loss, abnormal hair growth, ance  Psychiatric: No depression, crying  Hematologic: No bruises, bleeding, swollen lymph nodes, anemia.        OBJECTIVE:   The patient appears well, alert, oriented x 3, in no distress.  /80   Wt 104.9 kg (231 lb 2.4 oz)   BMI 45.14 kg/m²   NECK: no thyromegaly, trachea midline  SKIN: no acne, striae, hirsutism  BREAST EXAM: not examined  ABDOMEN: no hernias, masses, or hepatosplenomegaly  GENITALIA: normal external genitalia, no erythema, no  discharge  URETHRA: normal urethra, normal urethral meatus  VAGINA: Normal  CERVIX: no lesions or cervical motion tenderness  UTERUS: irregular  ADNEXA: no mass, fullness, tenderness        ENDOMETRIAL BIOPSY:     Component 1mo ago   Final Pathologic Diagnosis SCANT FRAGMENTS OF BENIGN ENDOCERVICAL TYPE GLANDULAR EPITHELIUM ADMIXED WITH   MUCUS, NO DYSPLASIA OR ENDOMETRIAL TISSUE IDENTIFIED.    Comment: Interpreted by: Gorge Lopez M.D., Signed on 08/14/2020 at 08:08         EXAMINATION:  US PELVIS COMP WITH TRANSVAG NON-OB (XPD)     CLINICAL HISTORY:  Excessive and frequent menstruation with regular cycle     TECHNIQUE:  Transabdominal sonography of the pelvis was performed, followed by transvaginal sonography to better evaluate the uterus and ovaries.     COMPARISON:  Pelvic ultrasound dated 01/29/2019     FINDINGS:  Uterus:     Size: 7.6 x 6.0 x 6.3 cm     Masses: 3.0 x 1.8 x 2.6 fibroid near the fundus.  Heterogeneous myometrium.     Endometrium: Limited visualization of the endometrial stripe.     Right ovary:     Not well visualized.     Left ovary:     Partially seen.     Size: 2.3 x 1.2 x 1.7 cm     Appearance: No significant abnormality.     Vascular Flow: Present     Free Fluid:     None.     Impression:     Uterine fibroid with heterogeneous myometrium.  Limited evaluation of the endometrial stripe.     Nonvisualized right ovary.        Electronically signed by: Isidro Bateman  Date:                                            08/11/2020  Time:                                           14:00        Lab Results   Component Value Date     WBC 8.07 02/02/2019     HGB 12.7 02/02/2019     HCT 39.2 02/02/2019     MCV 80 (L) 02/02/2019      (H) 02/02/2019      ASSESSMENT AND PLAN:     No diagnosis found. and plan     Discussed treatment options with patient including OCP's, Mirena IUD, UAE, endometrial ablation, and hysterectomy.  Patient desires TLH/BS  Consents done.

## 2020-10-06 NOTE — PLAN OF CARE
Pt in bed without complaints and no distress noted at this time. Pt prepared for surgery. Periop plan of care discussed with pt with time for questions and they verbalized understanding.

## 2020-10-06 NOTE — TRANSFER OF CARE
Anesthesia Transfer of Care Note    Patient: Raquel Rhodes    Procedure(s) Performed: Procedure(s) (LRB):  ROBOTIC HYSTERECTOMY (N/A)  ROBOTIC SALPINGECTOMY (Bilateral)    Patient location: PACU    Anesthesia Type: general    Transport from OR: Transported from OR on 6-10 L/min O2 by face mask with adequate spontaneous ventilation    Post pain: adequate analgesia    Post assessment: no apparent anesthetic complications and tolerated procedure well    Post vital signs: stable    Level of consciousness: awake    Nausea/Vomiting: no nausea/vomiting    Complications: none    Transfer of care protocol was followed      Last vitals:   Visit Vitals  BP (!) 141/69   Pulse 98   Temp 36.7 °C (98.1 °F) (Skin)   Resp (!) 36   Ht 5' (1.524 m)   Wt 99.8 kg (220 lb)   LMP 09/07/2020   SpO2 (!) 90%   Breastfeeding No   BMI 42.97 kg/m²

## 2020-10-06 NOTE — OP NOTE
DATE OF PROCEDURE:  10/6/20     PREOPERATIVE DIAGNOSIS:    Fibroids AUB     POSTOPERATIVE DIAGNOSIS:    same     SURGERY:  Robotic TLH/WINSTON, KENDRA     SURGEON:  Donis Vela M.D.     ASSISTANT:  Felipa Jhaveri     ANESTHESIA:  General endotracheal tube anesthesia.     ESTIMATED BLOOD LOSS:  100 mL     FINDINGS:  Normal bilateral tubes and ovaries and fibroid uterus uterus.     SPECIMENS:  Uterus and cervix. Bilateral tubes   COMPLICATIONS:  None.     OPERATIVE REPORT IN DETAIL:  After assuring informed consent, the patient was   taken to the Operating Room where general anesthesia was administered.  She was   then placed in lithotomy position.  Her vagina was prepped and then her abdomen   was prepped.  The patient was then draped.  A weighted speculum was placed in   the vagina.  The anterior lip of the cervix was grasped with a single-tooth   tenaculum and theRumi uterine manipulator was placed into the endocervix and   the endocervical balloon was inflated.  The vaginal cuff occluder was then   insufflated.  Once this was done, the single-tooth tenaculum had been removed.  The   Dawn catheter was then placed.Legs were lowered and bed leveled and then  Attention was then turned to the abdomen and an   Supraumbilical incision was made with the scalpel while tenting up on the   abdomen, a Veress needle was inserted.  A saline drop test was noted to be   within normal limits.  An 8 mm trocar then was placed into the supraumbilical   incision site and laparoscopic confirmation and location was achieved.  The   trocars were inserted while tenting up on the abdomen.  Once this was done, 8 mm   trocars were placed lateral of midline trocar in the right and left lower quadrants under laparoscopic visualization without complication. Remote centers were set and the robot was docked and tension released from trocar sites.  Attention was then turned to the uterus   with the findings noted above.  The Tubes  across the meso salpinx to th uteroovarian ligament was then cauterized   and transected using the Bipolar cautery device and scissors and then the round ligament was   cauterized and transected using the same devices.  The bladder flap was then   developed. There was dense adhesions BTW bladder and uterus from prior c/s scars and the adhesions were lysed with Lap scissors and to 90 min.The KENDRA was performed across  the entire bladder flap.  Once the   bladder was well down in the uterine arteries had been cauterized and transected   using the same devices, then using the laparoscopic scissors, the anterior colpotomy incision was made and the  cup was identified. The scissors were used until the entire colpotomy incision was made until the cervix was disconnected from the vaginal cuff. The uterus was then pulled into the vagina to use as a vaginal cuff occluder. Then using 0 V-Lock suture, the vaginal cuff angle on the right was obtained and then several running stitches  were placed into the vaginal cuff untilThe left vaginal cuff angle was closed then the suture locked by running back to the right midline of cuff.. The area was very hemostatic. The area was copiously irrigated.  No bleeding was noted.    Pneumoperitoneum was released.  No bleeding was noted. . The patient tolerated the procedure well.  Pneumoperitoneum was released, The skin was closed using 4-0 monolcryl suture in a subcuticular fashion.    The patient tolerated the procedure   well.  All counts were correct x2. Vaginal sweep performed and cuff intact and dry. The patient was taken to Recovery Room in   stable condition.    [facility]    Discharge Note    SUMMARY     Admit Date: 10/1/2019    Discharge Date and Time:  10/06/2020 12:22 PM    Hospital Course 45 y/o with SUF and AUB admitted for robotic lap hyst and BS with additional KENDRA. Procedure completed w/ocomplications ansd was d/c'd home on same day    Final Diagnosis:  Post-Op Diagnosis Codes:     * Adenomyosis [N80.0]     * Fibroids [D21.9]    Disposition: Home or Self Care    Follow Up/Patient Instructions:     Medications:  Reconciled Home Medications:      Medication List      ASK your doctor about these medications    DULoxetine 60 MG capsule  Commonly known as: CYMBALTA  Take 60 mg by mouth once daily.     hydrOXYzine pamoate 25 MG Cap  Commonly known as: VISTARIL  Take 15 mg by mouth as needed.        D/C pt. To home in stable condition  Reg diet  Ad stanley activity with pelvic rest x 6 wks  Call for fever >101, heavy vag bleeding, pain uncontrolled w/ pain meds  F/u in office in 1 week and  6-8wks for final check-up  Motrin 800mg, Vicodin 5/325mg  Donis Vela MD    No discharge procedures on file.

## 2020-10-07 VITALS
DIASTOLIC BLOOD PRESSURE: 56 MMHG | TEMPERATURE: 98 F | HEIGHT: 60 IN | HEART RATE: 97 BPM | SYSTOLIC BLOOD PRESSURE: 119 MMHG | BODY MASS INDEX: 44.66 KG/M2 | WEIGHT: 227.5 LBS | OXYGEN SATURATION: 98 % | RESPIRATION RATE: 16 BRPM

## 2020-10-07 PROCEDURE — 94799 UNLISTED PULMONARY SVC/PX: CPT

## 2020-10-07 PROCEDURE — 90686 IIV4 VACC NO PRSV 0.5 ML IM: CPT | Mod: SL | Performed by: OBSTETRICS & GYNECOLOGY

## 2020-10-07 PROCEDURE — 90471 IMMUNIZATION ADMIN: CPT | Mod: VFC | Performed by: OBSTETRICS & GYNECOLOGY

## 2020-10-07 PROCEDURE — 63600175 PHARM REV CODE 636 W HCPCS: Mod: SL | Performed by: OBSTETRICS & GYNECOLOGY

## 2020-10-07 PROCEDURE — 25000003 PHARM REV CODE 250: Performed by: OBSTETRICS & GYNECOLOGY

## 2020-10-07 RX ADMIN — HYDROCODONE BITARTRATE AND ACETAMINOPHEN 1 TABLET: 7.5; 325 TABLET ORAL at 01:10

## 2020-10-07 RX ADMIN — DULOXETINE HYDROCHLORIDE 60 MG: 30 CAPSULE, DELAYED RELEASE ORAL at 08:10

## 2020-10-07 RX ADMIN — TAMSULOSIN HYDROCHLORIDE 0.4 MG: 0.4 CAPSULE ORAL at 08:10

## 2020-10-07 RX ADMIN — INFLUENZA VIRUS VACCINE 0.5 ML: 15; 15; 15; 15 SUSPENSION INTRAMUSCULAR at 11:10

## 2020-10-07 RX ADMIN — HYDROCODONE BITARTRATE AND ACETAMINOPHEN 1 TABLET: 7.5; 325 TABLET ORAL at 05:10

## 2020-10-07 RX ADMIN — HYDROCODONE BITARTRATE AND ACETAMINOPHEN 1 TABLET: 7.5; 325 TABLET ORAL at 11:10

## 2020-10-07 NOTE — PLAN OF CARE
AOX4. VS stable. Safety maintained. Meds given per MAR. Pain treated with PRN meds. Lap sites X 3 CDI. O2@2L/min via NC. Resting quietly. Encouraged to call for assistance. SR up X 2. Call light in reach. Bed locked in lowest position. Will continue to monitor.

## 2020-10-07 NOTE — ANESTHESIA POSTPROCEDURE EVALUATION
Anesthesia Post Evaluation    Patient: Raquel Rhodes    Procedure(s) Performed: Procedure(s) (LRB):  ROBOTIC HYSTERECTOMY (N/A)  ROBOTIC SALPINGECTOMY (Bilateral)    Final Anesthesia Type: general    Patient location during evaluation: PACU  Patient participation: Yes- Able to Participate  Level of consciousness: awake and alert, oriented and awake  Post-procedure vital signs: reviewed and stable  Pain management: adequate  Airway patency: patent    PONV status at discharge: No PONV  Anesthetic complications: no      Cardiovascular status: blood pressure returned to baseline  Respiratory status: unassisted and room air  Hydration status: euvolemic  Follow-up not needed.          Vitals Value Taken Time   /56 10/07/20 0907   Temp 36.4 °C (97.5 °F) 10/07/20 0907   Pulse 97 10/07/20 0907   Resp 16 10/07/20 1123   SpO2 98 % 10/07/20 0900         Event Time   Out of Recovery 18:32:08         Pain/Sheron Score: Pain Rating Prior to Med Admin: 9 (10/7/2020 11:23 AM)  Pain Rating Post Med Admin: 2 (10/6/2020  5:11 PM)  Sheron Score: 10 (10/6/2020  9:08 PM)

## 2020-10-07 NOTE — PROGRESS NOTES
AVS printed and given to patient. AAOx4, VSS, patient c/o pain, managed with PRN medications. Family member at bedside. preparing for d/c

## 2020-10-07 NOTE — DISCHARGE SUMMARY
[facility]  Obstetrics & Gynecology  Discharge Summary    Patient Name: Raquel Rhodes  MRN: [id number]  Admission Date: 10/6/2020  Hospital Length of Stay: 0 days  Attending Physician: Carlos Mclean MD   Primary Care Provider: Ángel Flower MD    HPI:   Raquel Rhodes is a 44 y.o. female  presented with complaint of abnormal uterine bleeding.  She reported clots, heavy bleeding, double protection, and denied accidents.  Patient was scheduled for a robotic hysterectomy and bilateral oophorectomy.     Hospital Course:   Overall hospital course was uncomplicated.  Please see procedure/operative report for further details regarding the procedure by Dr. Carlos Mclean.  No immediate complications were noted after procedure.  Patient was observed overnight.  No acute events occurred.  Following morning, patient's pain well controlled, patient voiding without difficulty, patient able to ambulate without difficulty, and patient returned to room air with appropriate oxygen saturations.  However, overnight patient required oxygen supplementation via nasal cannula.  Concerns for possible JONATHAN/obesity hypoventilation syndrome and will order an ambulatory referral to Sleep Medicine for possible polysomnographic.  All questions/concerns were addressed.  The patient was encouraged to maintain her follow-up appointment with Dr. Mclean in approximately 1 week.  The patient was stable and appropriate for discharge.     Procedure(s) (LRB):  ROBOTIC HYSTERECTOMY (N/A)  ROBOTIC SALPINGECTOMY (Bilateral)     Consults:   Consults (From admission, onward)        Status Ordering Provider     Inpatient consult to Pulmonology  Once     Provider:  Sajan Scanlon MD    Completed CARLOS MCLEAN          Significant Diagnostic Studies:   Labs:   BMP:   Recent Labs   Lab 10/06/20  0553         K 3.7      CO2 26   BUN 15   CREATININE 0.7   CALCIUM 9.0   , CMP   Recent Labs   Lab  10/06/20  0553      K 3.7      CO2 26      BUN 15   CREATININE 0.7   CALCIUM 9.0   ANIONGAP 10   ESTGFRAFRICA >60   EGFRNONAA >60   , CBC   Recent Labs   Lab 10/06/20  0553   WBC 10.27   HGB 12.5   HCT 39.8   *   , INR No results found for: INR, PROTIME, Lipid Panel No results found for: CHOL, HDL, LDLCALC, TRIG, CHOLHDL, A1C: No results for input(s): HGBA1C in the last 4320 hours. and All labs within the past 24 hours have been reviewed  Specimen (12h ago, onward)    None          Pending Diagnostic Studies:     Procedure Component Value Units Date/Time    Specimen to Pathology, Surgery Gynecology and Obstetrics [[id number]] Collected: 10/06/20 1212    Order Status: Sent Lab Status: In process Updated: 10/06/20 1510        Final Active Diagnoses:    Diagnosis Date Noted POA    PRINCIPAL PROBLEM:  Fibroids [D21.9] 10/06/2020 Yes      Problems Resolved During this Admission:        Discharged Condition: good    Disposition: Home or Self Care    Follow Up:  Follow-up Information     Donis Ny MD In 1 week.    Specialty: Obstetrics and Gynecology  Why: Post op follow-up appointment  Contact information:  [address]  [address]  [place] [address] [phone]             Ángel Flower MD In 2 weeks.    Specialty: Family Medicine  Why: Hospital discharge follow-up  Contact information:  [address] [facility] [id number] [phone]                 Patient Instructions:      Ambulatory referral/consult to Sleep Disorders   Standing Status: Future   Referral Priority: Routine Referral Type: Consultation   Requested Specialty: Sleep Medicine   Number of Visits Requested: 1     Notify your health care provider if you experience any of the following:  temperature >100.4     Notify your health care provider if you experience any of the following:  persistent nausea and vomiting or diarrhea     Notify your health care provider if you experience any of the following:  severe  uncontrolled pain     Notify your health care provider if you experience any of the following:  redness, tenderness, or signs of infection (pain, swelling, redness, odor or green/yellow discharge around incision site)     Notify your health care provider if you experience any of the following:  difficulty breathing or increased cough     Notify your health care provider if you experience any of the following:  severe persistent headache     Notify your health care provider if you experience any of the following:  worsening rash     Notify your health care provider if you experience any of the following:  persistent dizziness, light-headedness, or visual disturbances     Notify your health care provider if you experience any of the following:  increased confusion or weakness     Other restrictions (specify):   Order Comments: Please call Dr. Ny's office for any questions/concerns.     Medications:  Reconciled Home Medications:      Medication List      START taking these medications    HYDROcodone-acetaminophen 5-325 mg per tablet  Commonly known as: NORCO  Take 1-2 tablet by mouth every 4-6 hours as needed     ibuprofen 600 MG tablet  Commonly known as: ADVIL,MOTRIN  take 1 tablet by mouth every 6 hours as needed        CONTINUE taking these medications    DULoxetine 60 MG capsule  Commonly known as: CYMBALTA  Take 60 mg by mouth once daily.     hydrOXYzine pamoate 25 MG Cap  Commonly known as: VISTARIL  Take 15 mg by mouth as needed.          Case discussed with attending physician, Dr. Yanely Zazueta DO  [facility]

## 2020-10-07 NOTE — PLAN OF CARE
[place] met with pt and SO Mr. Ervin Barbosa     pt lives with Mr. Mueller - he will assist as needed at home, will transport to home at d/c   indepndent prior to admit - no dme, no hh       s/p   Robotic ARIEL/KENDRA MONTERO  10/06   dx:  fibroids     Future Appointments   Date Time Provider Department Center   10/20/2020  1:15 PM Donis Vela MD [facility]        10/07/20 1156   Discharge Assessment   Assessment Type Discharge Planning Assessment   Confirmed/corrected address and phone number on facesheet? Yes   Assessment information obtained from? Patient;Medical Record   Expected Length of Stay (days) 1   Communicated expected length of stay with patient/caregiver yes   Prior to hospitilization cognitive status: Alert/Oriented   Prior to hospitalization functional status: Independent   Current cognitive status: Alert/Oriented   Current Functional Status: Independent   Lives With significant other   Able to Return to Prior Arrangements yes   Is patient able to care for self after discharge? Yes   Who are your caregiver(s) and their phone number(s)? SO Ervin Barbosa     Patient's perception of discharge disposition home or selfcare   Readmission Within the Last 30 Days no previous admission in last 30 days   Patient currently being followed by outpatient case management? No   Patient currently receives any other outside agency services? No   Equipment Currently Used at Home none   Do you have any problems affording any of your prescribed medications? No   Is the patient taking medications as prescribed? no   Does the patient have transportation home? Yes   Transportation Anticipated family or friend will provide   Does the patient receive services at the [facility] Clinic? No   Discharge Plan A Home;Home with family   DME Needed Upon Discharge  none   Patient/Family in Agreement with Plan yes

## 2020-10-08 ENCOUNTER — TELEPHONE (OUTPATIENT)
Dept: OBSTETRICS AND GYNECOLOGY | Facility: CLINIC | Age: 45
End: 2020-10-08

## 2020-10-08 NOTE — TELEPHONE ENCOUNTER
----- Message from Mayela Helm sent at 10/8/2020 10:35 AM CDT -----  Contact: PATIENT///   [phone]  CCTIMESENSITIVE         Name of Caller:     PATIENT   Reason for Visit/Symptoms:  POST-OP    Best Contact Number or Confirm if Mychart Preferred: [phone]  Preferred Date/Time of Appointment:10-13-20  Interested in Virtual Visit: NO  Additional Information:NONE

## 2020-10-08 NOTE — TELEPHONE ENCOUNTER
Called patient, informed patient I was calling to she how she was doing from her surgery on 10/6/20. Patient stated she is feeling well and needed to schedule a one week follow up. Postop appointment scheduled for 10/13/20 at 8:30am at the [place] office. Patient verbalized understanding.

## 2020-10-08 NOTE — TELEPHONE ENCOUNTER
Called patient, informed patient I was calling to she how she was doing from her surgery on 10/6/20. Patient stated she is feeling well and needed to schedule a one week follow up. Postop appointment scheduled for 10/13/20 at 8:30am at the [place] office. Patient verbalized understanding.

## 2020-10-10 LAB
FINAL PATHOLOGIC DIAGNOSIS: NORMAL
GROSS: NORMAL
Lab: NORMAL

## 2020-10-13 ENCOUNTER — OFFICE VISIT (OUTPATIENT)
Dept: OBSTETRICS AND GYNECOLOGY | Facility: CLINIC | Age: 45
End: 2020-10-13
Payer: MEDICAID

## 2020-10-13 VITALS — DIASTOLIC BLOOD PRESSURE: 88 MMHG | WEIGHT: 226.31 LBS | BODY MASS INDEX: 44.2 KG/M2 | SYSTOLIC BLOOD PRESSURE: 140 MMHG

## 2020-10-13 DIAGNOSIS — Z09 POSTOP CHECK: Primary | ICD-10-CM

## 2020-10-13 PROCEDURE — 99024 PR POST-OP FOLLOW-UP VISIT: ICD-10-PCS | Mod: ,,, | Performed by: OBSTETRICS & GYNECOLOGY

## 2020-10-13 PROCEDURE — 99213 OFFICE O/P EST LOW 20 MIN: CPT | Mod: PBBFAC,PO | Performed by: OBSTETRICS & GYNECOLOGY

## 2020-10-13 PROCEDURE — 99999 PR PBB SHADOW E&M-EST. PATIENT-LVL III: ICD-10-PCS | Mod: PBBFAC,,, | Performed by: OBSTETRICS & GYNECOLOGY

## 2020-10-13 PROCEDURE — 99024 POSTOP FOLLOW-UP VISIT: CPT | Mod: ,,, | Performed by: OBSTETRICS & GYNECOLOGY

## 2020-10-13 PROCEDURE — 99999 PR PBB SHADOW E&M-EST. PATIENT-LVL III: CPT | Mod: PBBFAC,,, | Performed by: OBSTETRICS & GYNECOLOGY

## 2020-10-13 NOTE — PROGRESS NOTES
Subjective:       Raquel Rhodes is a 44 y.o. female who presents to the clinic 1 weeks status post Robotic total laparoscopic hysterectomy with bilateral salpingectomy. Eating a regular diet without difficulty. Bowel movements are normal. Pain is controlled with current analgesics. Medications being used: ibuprofen (OTC) and narcotic analgesics including Norco.  She continues to report decreased activity secondary to soreness.  Patient reports that her symptoms/pain are improving.  Patient denies any strenuous activity which requires heavy lifting.    The following portions of the patient's history were reviewed and updated as appropriate: allergies, current medications, past family history, past medical history, past social history, past surgical history and problem list.    Review of Systems  Pertinent items are noted in HPI.      Objective:      BP (!) 140/88   Wt 102.6 kg (226 lb 4.8 oz)   LMP 09/07/2020   BMI 44.20 kg/m²   General:  alert, appears stated age and cooperative   Abdomen: soft, bowel sounds active, non-tender   Incision:   healing well, no drainage, evidence of mild allergic reaction to adhesive tape without evidence of infection,  no erythema, no hernia, no seroma, no swelling, no dehiscence, incision well approximated        Component 7d ago   Final Pathologic Diagnosis Uterus, cervix, bilateral fallopian tubes, hysterectomy and bilateral   salpingectomy:   Uterus (110 g):  Secretory phase endometrium negative for hyperplasia or   malignancy.  Benign leiomyoma.   Cervix:  Endocervical and ectocervical mucosa negative for dysplasia or   malignancy.   Bilateral fallopian tubes:  Benign tubal epithelium       Assessment:      Doing well postoperatively.  Operative findings again reviewed. Pathology report discussed.      Plan:      1. Continue any current medications.  2. Wound care discussed.  3. Activity restrictions: pelvic rest  4. Follow up: 5 week for repeat post op visit.    5.   Please call the office for any additional questions/concerns

## 2020-10-21 ENCOUNTER — TELEPHONE (OUTPATIENT)
Dept: ADMINISTRATIVE | Facility: OTHER | Age: 45
End: 2020-10-21

## 2020-11-05 RX ORDER — IBUPROFEN 600 MG/1
TABLET ORAL
Qty: 30 TABLET | Refills: 0 | Status: SHIPPED | OUTPATIENT
Start: 2020-11-05

## 2020-11-17 ENCOUNTER — OFFICE VISIT (OUTPATIENT)
Dept: OBSTETRICS AND GYNECOLOGY | Facility: CLINIC | Age: 45
End: 2020-11-17
Payer: MEDICAID

## 2020-11-17 VITALS
DIASTOLIC BLOOD PRESSURE: 80 MMHG | SYSTOLIC BLOOD PRESSURE: 140 MMHG | HEIGHT: 60 IN | BODY MASS INDEX: 44.15 KG/M2 | WEIGHT: 224.88 LBS

## 2020-11-17 DIAGNOSIS — Z09 POSTOP CHECK: ICD-10-CM

## 2020-11-17 DIAGNOSIS — N32.81 OAB (OVERACTIVE BLADDER): Primary | ICD-10-CM

## 2020-11-17 PROCEDURE — 87086 URINE CULTURE/COLONY COUNT: CPT

## 2020-11-17 PROCEDURE — 99024 PR POST-OP FOLLOW-UP VISIT: ICD-10-PCS | Mod: ,,, | Performed by: OBSTETRICS & GYNECOLOGY

## 2020-11-17 PROCEDURE — 99024 POSTOP FOLLOW-UP VISIT: CPT | Mod: ,,, | Performed by: OBSTETRICS & GYNECOLOGY

## 2020-11-17 PROCEDURE — 99999 PR PBB SHADOW E&M-EST. PATIENT-LVL III: ICD-10-PCS | Mod: PBBFAC,,, | Performed by: OBSTETRICS & GYNECOLOGY

## 2020-11-17 PROCEDURE — 99213 OFFICE O/P EST LOW 20 MIN: CPT | Mod: PBBFAC,PO | Performed by: OBSTETRICS & GYNECOLOGY

## 2020-11-17 PROCEDURE — 99999 PR PBB SHADOW E&M-EST. PATIENT-LVL III: CPT | Mod: PBBFAC,,, | Performed by: OBSTETRICS & GYNECOLOGY

## 2020-11-17 RX ORDER — OXYBUTYNIN CHLORIDE 5 MG/1
5 TABLET ORAL 2 TIMES DAILY
Qty: 60 TABLET | Refills: 2 | Status: SHIPPED | OUTPATIENT
Start: 2020-11-17 | End: 2021-11-17

## 2020-11-17 RX ORDER — LISINOPRIL 10 MG/1
TABLET ORAL
COMMUNITY
Start: 2020-11-04

## 2020-11-17 NOTE — PROGRESS NOTES
S/p robotic TLH/BS  Doing well, no vb  Urinating more frequently, no dysuria  Cuff healing well  D/c' pelvic rest in 2 wks  rx for ditropan sent in  U/a and cx sent

## 2020-11-19 LAB — BACTERIA UR CULT: NO GROWTH

## 2021-01-18 PROBLEM — Z09 POSTOP CHECK: Status: RESOLVED | Noted: 2020-10-13 | Resolved: 2021-01-18

## 2021-04-15 ENCOUNTER — PATIENT MESSAGE (OUTPATIENT)
Dept: RESEARCH | Facility: HOSPITAL | Age: 46
End: 2021-04-15

## 2021-07-29 ENCOUNTER — LAB VISIT (OUTPATIENT)
Dept: PRIMARY CARE CLINIC | Facility: OTHER | Age: 46
End: 2021-07-29
Attending: INTERNAL MEDICINE
Payer: MEDICAID

## 2021-07-29 DIAGNOSIS — Z20.822 ENCOUNTER FOR LABORATORY TESTING FOR COVID-19 VIRUS: ICD-10-CM

## 2021-07-29 PROCEDURE — U0003 INFECTIOUS AGENT DETECTION BY NUCLEIC ACID (DNA OR RNA); SEVERE ACUTE RESPIRATORY SYNDROME CORONAVIRUS 2 (SARS-COV-2) (CORONAVIRUS DISEASE [COVID-19]), AMPLIFIED PROBE TECHNIQUE, MAKING USE OF HIGH THROUGHPUT TECHNOLOGIES AS DESCRIBED BY CMS-2020-01-R: HCPCS | Performed by: INTERNAL MEDICINE

## 2021-07-31 LAB
SARS-COV-2 RNA RESP QL NAA+PROBE: NOT DETECTED
SARS-COV-2- CYCLE NUMBER: -1

## 2023-03-01 ENCOUNTER — TELEPHONE (OUTPATIENT)
Dept: OBSTETRICS AND GYNECOLOGY | Facility: CLINIC | Age: 48
End: 2023-03-01
Payer: MEDICAID

## 2023-03-01 NOTE — TELEPHONE ENCOUNTER
Left message, letting pt know her appointment will be moved down due to provider being in surgery

## 2024-07-27 ENCOUNTER — HOSPITAL ENCOUNTER (EMERGENCY)
Facility: HOSPITAL | Age: 49
Discharge: HOME | End: 2024-07-27
Attending: EMERGENCY MEDICINE
Payer: MEDICAID

## 2024-07-27 VITALS
SYSTOLIC BLOOD PRESSURE: 137 MMHG | HEIGHT: 60 IN | DIASTOLIC BLOOD PRESSURE: 82 MMHG | WEIGHT: 176 LBS | OXYGEN SATURATION: 96 % | TEMPERATURE: 99 F | BODY MASS INDEX: 34.55 KG/M2 | RESPIRATION RATE: 16 BRPM | HEART RATE: 97 BPM

## 2024-07-27 DIAGNOSIS — S99.922A FOOT INJURY, LEFT, INITIAL ENCOUNTER: ICD-10-CM

## 2024-07-27 PROCEDURE — 99283 EMERGENCY DEPT VISIT LOW MDM: CPT | Mod: 25

## 2024-07-27 RX ORDER — DICLOFENAC SODIUM 10 MG/G
2 GEL TOPICAL 4 TIMES DAILY
Qty: 20 G | Refills: 0 | Status: SHIPPED | OUTPATIENT
Start: 2024-07-27

## 2024-07-27 NOTE — ED NOTES
Pt presents to ED with C/O L lateral foot pain with onset last night. She states she stepped down and heard a pop in her foot. Pt states she did have swelling to the top and lateral side of the foot. She states the pain is 9/10 at this time. Pt states she did take Motrin for pain this am with little relief.

## 2024-07-27 NOTE — ED PROVIDER NOTES
"Encounter Date: 2024       History     Chief Complaint   Patient presents with    Foot Injury     Pt c/o left foot "popping" last night and has had pain since. Pt fx L foot in 2024. Pt took Motrin for pain w/ mild relief. Pt reports difficulty walking. No other sx.      Patient is a 48 y.o. female who presents to the ED for evaluation of pain to her left foot.   States that she missed step last night and rolled her foot  felt a popping sensation.  Patient had a fractures foot in 2024.  Reports that several years ago she also had a surgery on his foot.  Patient is able to ambulate. Patient notes associated symptoms of pain.  Denies associated swelling, bruising, numbness, deformity, abrasion, laceration, and loss of ROM. No other complaints at this time.     The history is provided by the patient.     Review of patient's allergies indicates:   Allergen Reactions    Cinnamon analogues Anaphylaxis    Keflex [cephalexin] Anaphylaxis    Percocet [oxycodone-acetaminophen] Hives    Piroxicam Other (See Comments)     Severe nose bleeds    Prednisone Shortness Of Breath    Pumpkin Anaphylaxis    Adhesive tape-silicones     Oxycodone-aspirin      Other reaction(s): RASH/SOB     No past medical history on file.  Past Surgical History:   Procedure Laterality Date     SECTION      x 2    ENDOMETRIAL ABLATION N/A 2019    Procedure: ABLATION, ENDOMETRIUM;  Surgeon: Alfred Gann MD;  Location: [facility] OR;  Service: OB/GYN;  Laterality: N/A;    FOOT SURGERY      HYSTEROSCOPY WITH DILATION AND CURETTAGE OF UTERUS N/A 2019    Procedure: HYSTEROSCOPY, WITH DILATION AND CURETTAGE OF UTERUS;  Surgeon: Alfred Gann MD;  Location: [facility] OR;  Service: OB/GYN;  Laterality: N/A;    ROBOT-ASSISTED LAPAROSCOPIC HYSTERECTOMY N/A 10/6/2020    Procedure: ROBOTIC HYSTERECTOMY;  Surgeon: Donis Vela MD;  Location: [facility] OR;  Service: OB/GYN;  Laterality: N/A;    ROBOT-ASSISTED SALPINGECTOMY " Bilateral 10/6/2020    Procedure: ROBOTIC SALPINGECTOMY;  Surgeon: Donis Vela MD;  Location: [facility] OR;  Service: OB/GYN;  Laterality: Bilateral;    TONSILLECTOMY      TUBAL LIGATION      after      Family History   Problem Relation Name Age of Onset    Diabetes Mother      Bipolar disorder Mother      Diabetes Father       Social History     Tobacco Use    Smoking status: Never    Smokeless tobacco: Never   Substance Use Topics    Alcohol use: No    Drug use: Never     Review of Systems   Constitutional:  Negative for fever.   Musculoskeletal:  Positive for arthralgias (left foot pain). Negative for gait problem and joint swelling.   Skin:  Negative for color change and wound.       Physical Exam     Initial Vitals [24 0940]   BP Pulse Resp Temp SpO2   137/82 97 16 98.7 °F (37.1 °C) 96 %      MAP       --         Physical Exam    Musculoskeletal:        Feet:            ED Course   Procedures  Labs Reviewed - No data to display       Imaging Results              X-Ray Foot Complete Left (Final result)  Result time 24 12:26:55      Final result by Ángel Stapleton MD (24 12:26:55)                   Impression:      No acute displaced fracture-dislocation identified.      Electronically signed by: Ángel Stapleton MD  Date:    2024  Time:    12:26               Narrative:    EXAMINATION:  XR FOOT COMPLETE 3 VIEW LEFT    CLINICAL HISTORY:  .  Unspecified injury of left foot, initial encounter    TECHNIQUE:  AP, lateral and oblique views of the left foot were performed.    COMPARISON:  None    FINDINGS:  Bones are well mineralized. Overall alignment is within normal limits.  Lisfranc articulation is congruent.  Small well corticated ossific body adjacent to the base of the 5th metatarsal likely sequela of remote trauma with nonunion versus nonunion of a secondary ossification center.  Otherwise, no acute displaced fracture, dislocation or destructive osseous identified.  Baseline  minimal DJD.  Small plantar calcaneal spur and enthesophyte at the Achilles insertion site.  No subcutaneous emphysema or radiopaque foreign body.                                       Medications - No data to display  Medical Decision Making  Patient is a afebrile, well appearing 48 y.o.  female who presents for evaluation of left foot pain. Patient is able to ambulate. Denies cyanosis, pallor, decreased strength or sensation. Pulses normal. Neurovascularly intact. The patient remained comfortable and stable during their visit in the ED. Details of ED course documented in ED workup.     Differential Diagnosis includes, but is not limited to: Fracture, dislocation, cellulitis, bursitis, muscle strain, ligament tear/sprain, soft tissue contusion, osteoarthritis     All historical, clinical, and radiographic findings reviewed and discussed with patient.    X-ray without evidence of any acute bony abnormalities, suggestive of chronic changes  to the base of the 5th metatarsal.  This area does correlate with area of tenderness. There are no concerning features on physical exam to suggest an emergent or life threatening condition. No further intervention is indicated at this time, the patient is at low risk for an emergent/life threatening medical condition at this time and I am of the belief that that it is safe to discharge the patient from the emergency department.     Patient has been counseled regarding the need for follow-up as well as the indications to return to the emergency room should new or worrisome developments (including but not limited to worsening pain, cyanosis, or loss of strength or sensation) occur. Referral placed to Podiatry. Additionally, patient instructed to follow up with PCP in 2-3 days for recheck of today's complaints.    Discharge and follow-up instructions discussed with the patient who expressed understanding and willingness to comply with recommendations. Patient discharged from the  emergency department in stable condition, in no acute distress.     Amount and/or Complexity of Data Reviewed  External Data Reviewed: radiology.     Details: Reviewed xray results from patient's foot fracture from [facility] from Feb 2024: Comminuted fracture of the 5th metacarpal base with intra-articular extension to the tarsometatarsal joint. Fracture planes with suspected extension to the superior aspect of the 4th and 5th articulation. Also noted is an accessory ossicle at the base of the 5th metacarpal. Mild calcaneal spurring.               ED Course as of 07/27/24 1243   Sat Jul 27, 2024   0954 Patient examined and assessed. Patient answering questions appropriately, speaking in complete sentences, respirations are even and unlabored.  AAO x 4.  [OB]   1224  X-rays still has not been read.  Nurse calling Radiology for update. [OB]   1230 Xray foot reviewed: Small well corticated ossific body adjacent to the base of the 5th metatarsal likely sequela of remote trauma with nonunion versus nonunion of a secondary ossification center.  Otherwise, no acute displaced fracture, dislocation or destructive osseous identified.  Baseline minimal DJD.  Small plantar calcaneal spur and enthesophyte at the Achilles insertion site.  No subcutaneous emphysema or radiopaque foreign body. [OB]      ED Course User Index  [OB] Rosy Lamb PA-C                           Clinical Impression:  Final diagnoses:  [S99.922A] Foot injury, left, initial encounter          ED Disposition Condition    Discharge Stable          ED Prescriptions       Medication Sig Dispense Start Date End Date Auth. Provider    diclofenac sodium (VOLTAREN ARTHRITIS PAIN) 1 % Gel Apply 2 g topically 4 (four) times daily. 20 g 7/27/2024 -- Rosy Lamb PA-C          Follow-up Information    None          Rosy Lamb PA-C  07/27/24 1243

## 2024-07-27 NOTE — DISCHARGE INSTRUCTIONS
Thank you for letting me care for you today - it was nice to meet you and I hope you feel better soon. Please follow up with your Podiatry if you continue to have pain. [facility] will call you within 48 hours to make an appointment, or you can call [phone] ([phone]) to schedule.     Rotate between Tylenol and ibuprofen (Motrin), switching every 3 hours. For example, Tylenol at 8am, ibuprofen at 11am, tylenol at 2pm.  Do not take more than 3000 mg of Tylenol in 1 day or more than 2400 mg of ibuprofen and 1 day.     I have also prescribed Voltaren Gel. You can rub this on the area that is causing you pain 4 times per day.       Our goal at [facility] is to always give you outstanding care and exceptional service. You may receive a survey by mail or email in the next week about your experience in our ED. We would greatly appreciate you completing and returning the survey. Your feedback provides us with a way to recognize our staff who give very good care and it helps us learn how to improve when your experience was below our aspiration of excellence.     All the best,     Rosy Lamb, MPH, PA-C  Emergency Department Physician Assistant  [facility], [facility] ER

## 2025-05-05 NOTE — PLAN OF CARE
Attempted to contact pt, needed to verify that pt accepted apt 5/7/25 @ 2:30 (if not will see if that works for pt. Will await return call.    Patient has received discharge instructions. Prescriptions received. Instructions reviewed with pt using teachback method. All questions answered to pt satisfaction. IV access  removed per floor nurse. Transport requested for discharge.

## (undated) DEVICE — SEE MEDLINE ITEM 157116

## (undated) DEVICE — GOWN SURGICAL XX LARGE X LONG

## (undated) DEVICE — CONTAINER PATHOLOGY W/LID 32OZ

## (undated) DEVICE — OCCLUDER COLPO-PNEUMO STERILE

## (undated) DEVICE — PAD CAST SPECIALIST STRL 4

## (undated) DEVICE — OBTURATOR BLADELESS 8MM XI CLR

## (undated) DEVICE — IRRIGATOR ENDOWRIST XI SUCTION

## (undated) DEVICE — CATH URETHRAL 16FR RED

## (undated) DEVICE — GLOVE BIOGEL 7.0

## (undated) DEVICE — IRRIGATOR ENDOSCOPY DISP.

## (undated) DEVICE — SEE MEDLINE ITEM 156953

## (undated) DEVICE — SEE MEDLINE ITEM 152622

## (undated) DEVICE — GLOVE SURG BIOGEL LATEX SZ 7.5

## (undated) DEVICE — APPLICATOR CHLORAPREP ORN 26ML

## (undated) DEVICE — SEE MEDLINE ITEM 157181

## (undated) DEVICE — SEAL UNIVERSAL 5MM-8MM XI

## (undated) DEVICE — PAD PREP 50/CA

## (undated) DEVICE — Device

## (undated) DEVICE — GLOVE PROTEXIS HYDROGEL SZ8

## (undated) DEVICE — COVER TIP CURVED SCISSORS XI

## (undated) DEVICE — TRAY FOLEY 16FR INFECTION CONT

## (undated) DEVICE — SEE MEDLINE ITEM 154981

## (undated) DEVICE — SYR 10CC LUER LOCK

## (undated) DEVICE — SEE MEDLINE ITEM 146292

## (undated) DEVICE — SEE MEDLINE ITEM 156952

## (undated) DEVICE — DRAPE ARM DAVINCI XI

## (undated) DEVICE — GAUZE SPONGE 4X4 12PLY

## (undated) DEVICE — BLADE SURG #15 CARBON STEEL

## (undated) DEVICE — SEE MEDLINE ITEM 152523

## (undated) DEVICE — COVER OVERHEAD SURG LT BLUE

## (undated) DEVICE — PANTIES FEMININE NAPKIN LG/XLG

## (undated) DEVICE — TIP RUMI GREEN DISPOSABLE6.7MM

## (undated) DEVICE — CLOSURE SKIN STERI STRIP 1/4X4

## (undated) DEVICE — SEE MEDLINE ITEM 146270

## (undated) DEVICE — GLOVE BIOGEL PI MICRO INDIC 7

## (undated) DEVICE — TIP RUMI BLUE DISPOSABLE 5/BX

## (undated) DEVICE — TUBING ARTRL PRESS MNTR M-F 4

## (undated) DEVICE — TIP RUMI WHITE DISP UMW676

## (undated) DEVICE — NDL HYPO REG 25G X 1 1/2

## (undated) DEVICE — DRESSING XEROFORM FOIL PK 1X8

## (undated) DEVICE — GAUZE SPONGE 4'X4 12 PLY

## (undated) DEVICE — DRESSING AQUACEL SACRAL LARGE

## (undated) DEVICE — SEE MEDLINE ITEM 146308

## (undated) DEVICE — ELECTRODE REM PLYHSV RETURN 9

## (undated) DEVICE — KIT WING PAD POSITIONING

## (undated) DEVICE — SEE MEDLINE ITEM 146355

## (undated) DEVICE — SUT 4/0 18IN COATED VICRYL

## (undated) DEVICE — UNDERGLOVES BIOGEL PI SZ 7 LF

## (undated) DEVICE — SYR 50CC LL

## (undated) DEVICE — DRAPE SURGICAL STERI IRRG PCH

## (undated) DEVICE — JELLY LUBRICANT STERILE 4 OZ

## (undated) DEVICE — NDL INSUF ULTRA VERESS 120MM

## (undated) DEVICE — SEE L#120831

## (undated) DEVICE — SUT 0 VICRYL / CT-1

## (undated) DEVICE — BANDAGE ADHESIVE

## (undated) DEVICE — PADDING CAST 4IN SPECIALIST

## (undated) DEVICE — SEE MEDLINE ITEM 157117

## (undated) DEVICE — DRAPE LAVH LAPAROSCOPY W/FLUID

## (undated) DEVICE — SOL IRR STRL WATER 500ML

## (undated) DEVICE — DEVICE ABLATION NOVASURE DISP

## (undated) DEVICE — DRESSING TELFA N ADH 3X8

## (undated) DEVICE — SEALER VESSEL EXTEND

## (undated) DEVICE — TOURNIQUET SB QC DP 18X4IN

## (undated) DEVICE — SET DECANTER MEDICHOICE

## (undated) DEVICE — SEE MEDLINE ITEM 156955

## (undated) DEVICE — SEE MEDLINE ITEM 152532

## (undated) DEVICE — GLOVE SURGICAL LATEX SZ 6.5